# Patient Record
Sex: MALE | Race: WHITE | Employment: FULL TIME | URBAN - METROPOLITAN AREA
[De-identification: names, ages, dates, MRNs, and addresses within clinical notes are randomized per-mention and may not be internally consistent; named-entity substitution may affect disease eponyms.]

---

## 2017-01-05 NOTE — PROGRESS NOTES
[facility] Internal [facility] Progress Note    CC:  Patient presents with:  Medical Question: Deplin & Wellbutrin       HPI:   HPI  Depression/Anxiety  Pt is currently on Wellbutrin and Deplin  Started Deplin in September or October  Pt feels like Gastrointestinal: Negative for nausea and vomiting. Psychiatric/Behavioral: Positive for sleep disturbance and dysphoric mood.  Negative for suicidal ideas, hallucinations, behavioral problems, confusion, self-injury, decreased concentration and agitati unspecified     Dysplastic nevi     Depressive disorder, not elsewhere classified     Cough     Nontoxic uninodular goiter     Goiter, specified as simple     Laboratory examination ordered as part of a routine general medical examination     Special scree

## 2017-01-05 NOTE — PATIENT INSTRUCTIONS
Thank you for choosing Abdiaziz Garcia PA-C at [place] [address]  To Do: Greg Prado  1. Pt to continue current medications as prescribed  2. Follow-up as scheduled  3.  Someone from office will call patient tomorrow to discuss more    • Please s potential risks and we strive to make you healthier and to improve your quality of life.     Referrals, and Radiology Information:    If your insurance requires a referral to a specialist, please allow 5 business days to process your referral request.    If

## 2017-01-06 ENCOUNTER — TELEPHONE (OUTPATIENT)
Dept: FAMILY MEDICINE CLINIC | Facility: CLINIC | Age: 47
End: 2017-01-06

## 2017-01-06 RX ORDER — ESCITALOPRAM OXALATE 10 MG/1
10 TABLET ORAL DAILY
Qty: 30 TABLET | Refills: 0 | Status: SHIPPED | OUTPATIENT
Start: 2017-01-06 | End: 2017-02-21

## 2017-01-06 NOTE — TELEPHONE ENCOUNTER
Please notify pt that I spoke with Dr. Tanner Cat who recommended he stay on Deplin and Wellbutrin, and begin Lexapro 10mg.   We can send more to pharmacy for pt if needed

## 2017-01-06 NOTE — TELEPHONE ENCOUNTER
Spoke with patient and informed him of Dr. Lokesh Ogden recommendations. Lexapro sent into [facility] on file. Patient states that he has enough deplin and wellbutrin to last until his appt.   Future Appointments  Date Time Provider Kiara Harris   1/18/20

## 2017-01-12 ENCOUNTER — TELEPHONE (OUTPATIENT)
Dept: FAMILY MEDICINE CLINIC | Facility: CLINIC | Age: 47
End: 2017-01-12

## 2017-01-12 NOTE — TELEPHONE ENCOUNTER
Requesting Recommendation for therapist  LOV: 1/5/17  RTC: prn  Future Appointments  Date Time Provider Kiara Harris   1/18/2017 9:15 AM Becka Malin MD [facility] [address]   3/6/2017 9:40 AM Becka Malin MD [facility] [address] [facility] [address]       Depression/An

## 2017-01-12 NOTE — TELEPHONE ENCOUNTER
Per Anisha Ayoub patient should see therapist Mitra hobson, Meghan Romero [phone]. Patient informed and he will call and schedule.

## 2017-01-18 ENCOUNTER — APPOINTMENT (OUTPATIENT)
Dept: LAB | Age: 47
End: 2017-01-18
Attending: INTERNAL MEDICINE
Payer: COMMERCIAL

## 2017-01-18 ENCOUNTER — OFFICE VISIT (OUTPATIENT)
Dept: FAMILY MEDICINE CLINIC | Facility: CLINIC | Age: 47
End: 2017-01-18

## 2017-01-18 VITALS
SYSTOLIC BLOOD PRESSURE: 134 MMHG | RESPIRATION RATE: 16 BRPM | HEART RATE: 76 BPM | WEIGHT: 236 LBS | DIASTOLIC BLOOD PRESSURE: 80 MMHG | HEIGHT: 73 IN | BODY MASS INDEX: 31.28 KG/M2

## 2017-01-18 DIAGNOSIS — F32.A ANXIETY AND DEPRESSION: ICD-10-CM

## 2017-01-18 DIAGNOSIS — G47.33 OSA (OBSTRUCTIVE SLEEP APNEA): ICD-10-CM

## 2017-01-18 DIAGNOSIS — R39.198 DECREASED URINE STREAM: Primary | ICD-10-CM

## 2017-01-18 DIAGNOSIS — F41.9 ANXIETY AND DEPRESSION: ICD-10-CM

## 2017-01-18 DIAGNOSIS — R39.198 DECREASED URINE STREAM: ICD-10-CM

## 2017-01-18 LAB
BUN BLD-MCNC: 14 MG/DL (ref 8–20)
CALCIUM BLD-MCNC: 9.3 MG/DL (ref 8.3–10.3)
CHLORIDE: 105 MMOL/L (ref 101–111)
CO2: 29 MMOL/L (ref 22–32)
CREAT BLD-MCNC: 1.17 MG/DL (ref 0.7–1.3)
GLUCOSE BLD-MCNC: 64 MG/DL (ref 70–99)
POTASSIUM SERPL-SCNC: 3.9 MMOL/L (ref 3.6–5.1)
PSA SERPL-MCNC: 0.46 NG/ML (ref 0.01–4)
SODIUM SERPL-SCNC: 140 MMOL/L (ref 136–144)

## 2017-01-18 PROCEDURE — 84153 ASSAY OF PSA TOTAL: CPT

## 2017-01-18 PROCEDURE — 80048 BASIC METABOLIC PNL TOTAL CA: CPT

## 2017-01-18 PROCEDURE — 36415 COLL VENOUS BLD VENIPUNCTURE: CPT

## 2017-01-18 PROCEDURE — 99214 OFFICE O/P EST MOD 30 MIN: CPT | Performed by: INTERNAL MEDICINE

## 2017-01-18 NOTE — PROGRESS NOTES
CC: Patient presents with:  Medication Follow-Up       HPI:   Some decreased urinary stream. Some questions about how meds work. Questions about urinary stream. Mood is better.        Current Outpatient Prescriptions:  escitalopram 10 MG Oral Tab Take apparent distress, A/O x3  HEENT: PERRLA  LUNGS: clear to auscultation bilat   CARDIO: RRR without murmur  GI: +BS's      Orders Placed This Encounter  PSA  Standing Status: Future  Standing Expiration Date: 7/96/9932  Basic Metabolic Panel (8) [E]  Standi

## 2017-01-20 ENCOUNTER — TELEPHONE (OUTPATIENT)
Dept: FAMILY MEDICINE CLINIC | Facility: CLINIC | Age: 47
End: 2017-01-20

## 2017-02-03 ENCOUNTER — TELEPHONE (OUTPATIENT)
Dept: FAMILY MEDICINE CLINIC | Facility: CLINIC | Age: 47
End: 2017-02-03

## 2017-02-03 RX ORDER — BUPROPION HYDROCHLORIDE 150 MG/1
150 TABLET ORAL DAILY
Qty: 30 TABLET | Refills: 2 | Status: SHIPPED | OUTPATIENT
Start: 2017-02-03 | End: 2017-05-09

## 2017-02-03 NOTE — TELEPHONE ENCOUNTER
Requesting Wellbutrin 150mg lower dose  LOV: 1/18/17  RTC: 2 months  Last Labs: n/a      Future Appointments  Date Time Provider Kiara Kimberly   2/17/2017 9:00 AM Anna Pedersen MD [facility]   3/6/2017 9:40 AM Laura Montoya MD [facility]

## 2017-02-22 RX ORDER — ESCITALOPRAM OXALATE 10 MG/1
TABLET ORAL
Qty: 30 TABLET | Refills: 0 | Status: SHIPPED | OUTPATIENT
Start: 2017-02-22 | End: 2017-03-28

## 2017-02-22 NOTE — TELEPHONE ENCOUNTER
Requesting escitalopram  LOV: 1/18/17  RTC: 2 months   Last Labs:   Filled: 1/6/17  #30 with 0 refills    Future Appointments  Date Time Provider Kiara Kimberly   3/6/2017 9:40 AM Saravanan Lawson MD [facility] [address]   3/6/2017 1:00 PM KARLA Garcia

## 2017-03-29 NOTE — TELEPHONE ENCOUNTER
Requesting Escitalopram  LOV: 1/18/17  RTC: 2 months  Last Labs: n/a  Filled: 2/22/17 #30 with 0 refills    Future Appointments  Date Time Provider Kiraa Harris   6/14/2017 10:20 AM Sandra Madison MD [facility] [address]       Non protocol med pended fo

## 2017-03-30 RX ORDER — ESCITALOPRAM OXALATE 10 MG/1
TABLET ORAL
Qty: 30 TABLET | Refills: 2 | Status: SHIPPED | OUTPATIENT
Start: 2017-03-30 | End: 2017-06-21

## 2017-05-10 RX ORDER — BUPROPION HYDROCHLORIDE 150 MG/1
TABLET ORAL
Qty: 30 TABLET | Refills: 0 | Status: SHIPPED | OUTPATIENT
Start: 2017-05-10 | End: 2017-06-12

## 2017-05-10 NOTE — TELEPHONE ENCOUNTER
Requesting: Bupropion XL 150mg  LOV: 1/18/17  RTC: 2 months  Last Labs: 6/10/16  Filled: 2/3/17 #30 with 2 refills    Future Appointments  Date Time Provider Kiara Harris   6/14/2017 10:20 AM Ricardo Noonan MD [facility] [address] [facility] [address]       -Medication pend

## 2017-06-14 RX ORDER — BUPROPION HYDROCHLORIDE 150 MG/1
TABLET ORAL
Qty: 8 TABLET | Refills: 0 | Status: SHIPPED | OUTPATIENT
Start: 2017-06-14 | End: 2017-06-21

## 2017-06-14 NOTE — TELEPHONE ENCOUNTER
Requesting: Bupropion XL 150mg  LOV: 1/18/17  RTC: 2 months  Last Labs: 1/18/17  Filled: 5/10/17 #30 with 0 refills    Future Appointments  Date Time Provider Kiara Harris   6/21/2017 9:20 AM Riley Kebede MD [facility] [address] [facility] [address]       -Medication pend

## 2017-06-14 NOTE — TELEPHONE ENCOUNTER
Shaan is calling to follow up on request as pt is leaving to go out of town today and would like to take medication with him. Please advise.

## 2017-06-14 NOTE — TELEPHONE ENCOUNTER
Requesting : wellbutrin for anxiety and depression  LOV: 1/18/17 discussed the above  RTC: 2 months - March  Last Labs: 6/10/16  Filled: 5/10/17 #30 with 0 refills    Future Appointments  Date Time Provider Kiara Harris   6/21/2017 9:20 AM Maynor Patrick

## 2017-06-21 ENCOUNTER — OFFICE VISIT (OUTPATIENT)
Dept: FAMILY MEDICINE CLINIC | Facility: CLINIC | Age: 47
End: 2017-06-21

## 2017-06-21 VITALS
SYSTOLIC BLOOD PRESSURE: 124 MMHG | RESPIRATION RATE: 16 BRPM | HEART RATE: 78 BPM | DIASTOLIC BLOOD PRESSURE: 80 MMHG | HEIGHT: 72 IN | BODY MASS INDEX: 32.23 KG/M2 | WEIGHT: 238 LBS

## 2017-06-21 DIAGNOSIS — F41.9 ANXIETY AND DEPRESSION: ICD-10-CM

## 2017-06-21 DIAGNOSIS — Z12.5 SCREENING PSA (PROSTATE SPECIFIC ANTIGEN): ICD-10-CM

## 2017-06-21 DIAGNOSIS — F32.A ANXIETY AND DEPRESSION: ICD-10-CM

## 2017-06-21 DIAGNOSIS — G47.33 OSA (OBSTRUCTIVE SLEEP APNEA): Primary | ICD-10-CM

## 2017-06-21 DIAGNOSIS — Z00.00 LABORATORY EXAMINATION ORDERED AS PART OF A ROUTINE GENERAL MEDICAL EXAMINATION: ICD-10-CM

## 2017-06-21 PROCEDURE — 99213 OFFICE O/P EST LOW 20 MIN: CPT | Performed by: INTERNAL MEDICINE

## 2017-06-21 RX ORDER — ESCITALOPRAM OXALATE 10 MG/1
TABLET ORAL
Qty: 90 TABLET | Refills: 3 | Status: SHIPPED | OUTPATIENT
Start: 2017-06-21 | End: 2018-06-26

## 2017-06-21 RX ORDER — BUPROPION HYDROCHLORIDE 150 MG/1
TABLET ORAL
Qty: 30 TABLET | Refills: 0 | OUTPATIENT
Start: 2017-06-21

## 2017-06-21 RX ORDER — BUPROPION HYDROCHLORIDE 150 MG/1
150 TABLET ORAL
Qty: 90 TABLET | Refills: 3 | Status: SHIPPED | OUTPATIENT
Start: 2017-06-21 | End: 2018-06-26

## 2017-06-21 NOTE — PROGRESS NOTES
CC: Patient presents with:  Medication Follow-Up       HPI:   1. DANILO (obstructive sleep apnea)  (primary encounter diagnosis), saw sleep doc, can't afford machine at this point.    2. Anxiety and depression, doing well on wellbutrin and lexparo, mood Date: 6/21/2018  CBC W/DIFF  Standing Status: Future  Standing Expiration Date: 6/21/2018  LIPID PANEL  Standing Status: Future  Standing Expiration Date: 6/21/2018  PSA  Standing Status: Future  Standing Expiration Date: 6/21/2018  TSH  Standing Status: F

## 2017-07-25 NOTE — ADDENDUM NOTE
Encounter addended by: Dari Hall MA on: 7/25/2017  9:40 AM<BR>    Actions taken: Letter status changed

## 2017-08-16 ENCOUNTER — OFFICE VISIT (OUTPATIENT)
Dept: FAMILY MEDICINE CLINIC | Facility: CLINIC | Age: 47
End: 2017-08-16

## 2017-08-16 VITALS
HEIGHT: 72 IN | SYSTOLIC BLOOD PRESSURE: 128 MMHG | BODY MASS INDEX: 31.83 KG/M2 | DIASTOLIC BLOOD PRESSURE: 78 MMHG | HEART RATE: 78 BPM | WEIGHT: 235 LBS | RESPIRATION RATE: 16 BRPM

## 2017-08-16 DIAGNOSIS — L72.3 SCROTAL SEBACEOUS CYST: Primary | ICD-10-CM

## 2017-08-16 PROCEDURE — 99213 OFFICE O/P EST LOW 20 MIN: CPT | Performed by: INTERNAL MEDICINE

## 2017-08-16 NOTE — PROGRESS NOTES
CC: Patient presents with:  Growth: x 2 months       HPI:   Growth on scrotum in past 2 months. No pain or drainage.        Current Outpatient Prescriptions:  escitalopram 10 MG Oral Tab TAKE 1 TABLET(10 MG) BY MOUTH DAILY Disp: 90 tablet Rfl: 3   BuP (primary encounter diagnosis), small about 1 cm, will send to urology for drainage     The patient indicates understanding of these issues and agrees to the plan. Return if symptoms worsen or fail to improve.

## 2018-03-12 ENCOUNTER — TELEPHONE (OUTPATIENT)
Dept: FAMILY MEDICINE CLINIC | Facility: CLINIC | Age: 48
End: 2018-03-12

## 2018-03-12 NOTE — TELEPHONE ENCOUNTER
Pt is requesting a 30 day supply  Of a muscle relaxer and an anti inflamatory for Back pain. Pt doesn't want an appointment if possible. Please advise. Pt is on phone.

## 2018-03-12 NOTE — TELEPHONE ENCOUNTER
Pt would like a refill on medication he was given for his back pain by dr Aldridge Most remember what the medication was called , I suggested for him to make an appt and he refused and got upset and wants a nurse to call him back . Sean Stuart  Please advise

## 2018-03-12 NOTE — TELEPHONE ENCOUNTER
Pt was not able to remember what the medication was called that's when I suggested an appt and he refused and wouldn't talk to me , wanted to speak to a nurse or the doctor, sorry I could not get any more info

## 2018-03-12 NOTE — TELEPHONE ENCOUNTER
PT states he hurt his back over the weekend, informed pt that he will need to be seen in order to make sure he is receiving the appropriate care and that our physicians can not prescribe anything over the phone before examining him.  Pt states all he wants

## 2018-03-12 NOTE — TELEPHONE ENCOUNTER
Attempted to contact patient to discuss. No answer and mailbox is full. What medication is he requesting?

## 2018-03-12 NOTE — TELEPHONE ENCOUNTER
Left detailed message for pt to call back to schedule appt as he has not been seen 08/2017 and needs to EST care with a provider in our office.      LOV: 8/16/17 Dr Jose Angel Saini     Possible medication prescribed for back pain based on pts chart review:  Harrison Lundy

## 2018-05-10 ENCOUNTER — OFFICE VISIT (OUTPATIENT)
Dept: FAMILY MEDICINE CLINIC | Facility: CLINIC | Age: 48
End: 2018-05-10

## 2018-05-10 VITALS
TEMPERATURE: 98 F | SYSTOLIC BLOOD PRESSURE: 130 MMHG | HEIGHT: 72 IN | DIASTOLIC BLOOD PRESSURE: 74 MMHG | RESPIRATION RATE: 16 BRPM | HEART RATE: 70 BPM | BODY MASS INDEX: 32.23 KG/M2 | WEIGHT: 238 LBS

## 2018-05-10 DIAGNOSIS — M54.16 LUMBAR RADICULAR PAIN: Primary | ICD-10-CM

## 2018-05-10 DIAGNOSIS — Z12.11 SCREENING FOR COLON CANCER: ICD-10-CM

## 2018-05-10 PROCEDURE — 99213 OFFICE O/P EST LOW 20 MIN: CPT | Performed by: PHYSICIAN ASSISTANT

## 2018-05-10 RX ORDER — CYCLOBENZAPRINE HCL 10 MG
10 TABLET ORAL 3 TIMES DAILY PRN
Qty: 60 TABLET | Refills: 2 | Status: SHIPPED | OUTPATIENT
Start: 2018-05-10 | End: 2018-05-17

## 2018-05-10 RX ORDER — HYDROCODONE BITARTRATE AND ACETAMINOPHEN 5; 325 MG/1; MG/1
1 TABLET ORAL 2 TIMES DAILY PRN
Qty: 30 TABLET | Refills: 0 | Status: SHIPPED | OUTPATIENT
Start: 2018-05-10 | End: 2018-07-18 | Stop reason: ALTCHOICE

## 2018-05-10 RX ORDER — METHYLPREDNISOLONE 4 MG/1
TABLET ORAL
Qty: 1 KIT | Refills: 0 | Status: SHIPPED | OUTPATIENT
Start: 2018-05-10 | End: 2018-07-18 | Stop reason: ALTCHOICE

## 2018-05-10 NOTE — PATIENT INSTRUCTIONS
Thank you for choosing Twila Anguiano PA-C at [place] [address]  To Do: Manjinder Prado  1. Pt to begin medications as prescribed  2. Heat to back   3. Start physical therapy  4.  Follow-up in 1 month      • Please signup for MY CHART, which is elec company approved your testing, please call Central Scheduling at [phone]  Please allow our office 5 business days to contact you regarding any testing results.     Refill policies:   Allow 3 business days for refills; controlled substances may take cassidy

## 2018-05-17 ENCOUNTER — OFFICE VISIT (OUTPATIENT)
Dept: PHYSICAL THERAPY | Age: 48
End: 2018-05-17
Attending: PHYSICIAN ASSISTANT
Payer: COMMERCIAL

## 2018-05-17 DIAGNOSIS — M54.16 LUMBAR RADICULAR PAIN: ICD-10-CM

## 2018-05-17 PROCEDURE — 97110 THERAPEUTIC EXERCISES: CPT

## 2018-05-17 PROCEDURE — 97163 PT EVAL HIGH COMPLEX 45 MIN: CPT

## 2018-05-17 NOTE — PROGRESS NOTES
SPINE EVALUATION:   Referring Physician: Dr. Lang Sanabria  Diagnosis: Lumbar radicular pain (M54.16)       Date of Service: 5/17/2018     PATIENT SUMMARY   Marty España is a 46year old y/o male who presents to therapy today with complaints of back pa LBP beginning 2 weeks prior.  Exam findings include: impaired lumbar flexion AROM and painful; rigid and guarded posture; LE strength deficits to include B abductors; decreased flexibility of B hip flexors, piriformis, and gastroc-soleus; radiating symptoms pain education, proper body mechanics, activity modification, correct exercise technique, POC, log roll, sleeping with pillow between legs. LA distraction x 2 min. Repeated extensions: no pain though no change/relief with forward flexion.  Attempts at openi Potential:good    FOTO: 16/100    Current G Code: Changing and Maintaining Body Position CM: 80-99% impaired, limited, or restricted  Projected G Code: Changing and Maintaining Body Position CK: 40-59% impaired, limited, or restricted      Patient/Family/C

## 2018-05-22 ENCOUNTER — OFFICE VISIT (OUTPATIENT)
Dept: PHYSICAL THERAPY | Age: 48
End: 2018-05-22
Attending: PHYSICIAN ASSISTANT
Payer: COMMERCIAL

## 2018-05-22 DIAGNOSIS — M54.16 LUMBAR RADICULAR PAIN: ICD-10-CM

## 2018-05-22 PROCEDURE — 97110 THERAPEUTIC EXERCISES: CPT

## 2018-05-22 PROCEDURE — 97140 MANUAL THERAPY 1/> REGIONS: CPT

## 2018-05-22 PROCEDURE — 97014 ELECTRIC STIMULATION THERAPY: CPT

## 2018-05-22 NOTE — PROGRESS NOTES
Dx: Lumbar radicular pain (M54.16)           Authorized # of Visits:  12         Next MD visit: none scheduled  Fall Risk: standard         Precautions: n/a           Referring Provider: Red Mabry    Subjective: Pt states that symptoms are better strength of 5/5 to improve ease of walking for greater than 20 min  · Pt will be independent and compliant with comprehensive HEP to maintain progress achieved in PT          Plan: decrease irritability with manual therapy, progress AROM of lumbar spine, s

## 2018-05-24 ENCOUNTER — OFFICE VISIT (OUTPATIENT)
Dept: PHYSICAL THERAPY | Age: 48
End: 2018-05-24
Attending: PHYSICIAN ASSISTANT
Payer: COMMERCIAL

## 2018-05-24 DIAGNOSIS — M54.16 LUMBAR RADICULAR PAIN: ICD-10-CM

## 2018-05-24 PROCEDURE — 97014 ELECTRIC STIMULATION THERAPY: CPT

## 2018-05-24 PROCEDURE — 97140 MANUAL THERAPY 1/> REGIONS: CPT

## 2018-05-24 PROCEDURE — 97110 THERAPEUTIC EXERCISES: CPT

## 2018-05-24 NOTE — PROGRESS NOTES
Dx: Lumbar radicular pain (M54.16)           Authorized # of Visits:  12         Next MD visit: none scheduled  Fall Risk: standard         Precautions: n/a           Referring Provider: Imani Swartz    Subjective: Pt felt a little sore following se improve ease of walking for greater than 20 min  · Pt will be independent and compliant with comprehensive HEP to maintain progress achieved in PT        Plan: decrease irritability with manual therapy, progress AROM of lumbar spine, strengthening LE, prog

## 2018-05-30 ENCOUNTER — OFFICE VISIT (OUTPATIENT)
Dept: PHYSICAL THERAPY | Age: 48
End: 2018-05-30
Attending: PHYSICIAN ASSISTANT
Payer: COMMERCIAL

## 2018-05-30 DIAGNOSIS — M54.16 LUMBAR RADICULAR PAIN: ICD-10-CM

## 2018-05-30 PROCEDURE — 97140 MANUAL THERAPY 1/> REGIONS: CPT

## 2018-05-30 PROCEDURE — 97110 THERAPEUTIC EXERCISES: CPT

## 2018-05-30 PROCEDURE — 97014 ELECTRIC STIMULATION THERAPY: CPT

## 2018-05-30 NOTE — PROGRESS NOTES
Dx: Lumbar radicular pain (M54.16)           Authorized # of Visits:  12         Next MD visit: none scheduled  Fall Risk: standard         Precautions: n/a           Referring Provider: Rio Hutchison    Subjective: Pt did a lot of running around [employer]     Date: 5/22/2018  Tx#: 2/12 Date: 5/24/2018  Tx#: 3/12 Date: 5/30/2018  Tx#: 4/12 Date: Tx#: 5/ Date: Tx#: 6/ Date: Tx#: 7/ Date:    Tx#: 8/   Nu-Step 5 min, 2.5 level 5 min,  5 min, 3 level       Manual P-A, transverse lumbar spine, mid

## 2018-06-04 ENCOUNTER — APPOINTMENT (OUTPATIENT)
Dept: PHYSICAL THERAPY | Age: 48
End: 2018-06-04
Attending: PHYSICIAN ASSISTANT
Payer: COMMERCIAL

## 2018-06-06 ENCOUNTER — OFFICE VISIT (OUTPATIENT)
Dept: PHYSICAL THERAPY | Age: 48
End: 2018-06-06
Attending: PHYSICIAN ASSISTANT
Payer: COMMERCIAL

## 2018-06-06 ENCOUNTER — APPOINTMENT (OUTPATIENT)
Dept: PHYSICAL THERAPY | Age: 48
End: 2018-06-06
Attending: PHYSICIAN ASSISTANT
Payer: COMMERCIAL

## 2018-06-06 PROCEDURE — 97110 THERAPEUTIC EXERCISES: CPT | Performed by: PHYSICAL THERAPIST

## 2018-06-06 PROCEDURE — 97014 ELECTRIC STIMULATION THERAPY: CPT | Performed by: PHYSICAL THERAPIST

## 2018-06-06 PROCEDURE — 97140 MANUAL THERAPY 1/> REGIONS: CPT | Performed by: PHYSICAL THERAPIST

## 2018-06-06 NOTE — PROGRESS NOTES
Dx: Lumbar radicular pain (M54.16)           Authorized # of Visits:  12         Next MD visit: none scheduled  Fall Risk: standard         Precautions: n/a           Referring Provider: Pili Daugherty    Subjective: Pt reports that increased physical 6/7/2018  Tx#: 5/ Date: Tx#: 6/ Date: Tx#: 7/ Date:    Tx#: 8/   Nu-Step 5 min, 2.5 level 5 min,  5 min, 3 level X 5'       Manual P-A, transverse lumbar spine, midlevel Grade III; STM thoracic and lumbar parapsinals B; lacrosse ball to gluteals R; hip

## 2018-06-18 ENCOUNTER — OFFICE VISIT (OUTPATIENT)
Dept: PHYSICAL THERAPY | Age: 48
End: 2018-06-18
Attending: PHYSICIAN ASSISTANT
Payer: COMMERCIAL

## 2018-06-18 DIAGNOSIS — M54.16 LUMBAR RADICULAR PAIN: ICD-10-CM

## 2018-06-18 PROCEDURE — 97110 THERAPEUTIC EXERCISES: CPT | Performed by: PHYSICAL THERAPIST

## 2018-06-18 PROCEDURE — 97140 MANUAL THERAPY 1/> REGIONS: CPT | Performed by: PHYSICAL THERAPIST

## 2018-06-18 PROCEDURE — 97112 NEUROMUSCULAR REEDUCATION: CPT | Performed by: PHYSICAL THERAPIST

## 2018-06-18 NOTE — PROGRESS NOTES
Dx: Lumbar radicular pain (M54.16)           Authorized # of Visits:  12         Next MD visit: none scheduled  Fall Risk: standard         Precautions: n/a           Referring Provider: Ricki Franz    Subjective: Pt reports that he has been busy a 5'  TM x 3.5 min with      Manual P-A, transverse lumbar spine, midlevel Grade III; STM thoracic and lumbar parapsinals B; lacrosse ball to gluteals R; hip grade 3 inferior and lateral mobs MWM R x 25 min P-A, transverse lumbar, grade III; grade 4 oscillat

## 2018-06-20 ENCOUNTER — APPOINTMENT (OUTPATIENT)
Dept: PHYSICAL THERAPY | Age: 48
End: 2018-06-20
Attending: PHYSICIAN ASSISTANT
Payer: COMMERCIAL

## 2018-06-25 ENCOUNTER — TELEPHONE (OUTPATIENT)
Dept: PHYSICAL THERAPY | Age: 48
End: 2018-06-25

## 2018-06-25 ENCOUNTER — APPOINTMENT (OUTPATIENT)
Dept: PHYSICAL THERAPY | Age: 48
End: 2018-06-25
Attending: PHYSICIAN ASSISTANT
Payer: COMMERCIAL

## 2018-06-25 NOTE — TELEPHONE ENCOUNTER
Patient will need refills on the generic for Welbutrin and Lexapro. Patient only has two days left of medication. Yeni send to [facility]. 30. Patient was last seen in May 2018.

## 2018-06-26 RX ORDER — BUPROPION HYDROCHLORIDE 150 MG/1
TABLET ORAL
Qty: 90 TABLET | Refills: 0 | Status: SHIPPED | OUTPATIENT
Start: 2018-06-26 | End: 2018-09-26

## 2018-06-26 RX ORDER — BUPROPION HYDROCHLORIDE 150 MG/1
150 TABLET ORAL
Qty: 90 TABLET | Refills: 3 | OUTPATIENT
Start: 2018-06-26

## 2018-06-26 RX ORDER — ESCITALOPRAM OXALATE 10 MG/1
TABLET ORAL
Qty: 90 TABLET | Refills: 3 | OUTPATIENT
Start: 2018-06-26

## 2018-06-26 RX ORDER — ESCITALOPRAM OXALATE 10 MG/1
TABLET ORAL
Qty: 90 TABLET | Refills: 0 | Status: SHIPPED | OUTPATIENT
Start: 2018-06-26 | End: 2018-09-26

## 2018-06-26 NOTE — TELEPHONE ENCOUNTER
ESCITALOPRAM 10MG TABLETS  Will file in chart as: ESCITALOPRAM 10 MG Oral Tab  TAKE 1 TABLET(10 MG) BY MOUTH DAILY       Disp: 90 tablet Refills: 0    Class: Normal Start: 6/26/2018   Originally ordered: 1 year ago by Breonna Cordova MD  Last refill: 3/10/201

## 2018-06-27 ENCOUNTER — APPOINTMENT (OUTPATIENT)
Dept: PHYSICAL THERAPY | Age: 48
End: 2018-06-27
Attending: PHYSICIAN ASSISTANT
Payer: COMMERCIAL

## 2018-06-27 ENCOUNTER — TELEPHONE (OUTPATIENT)
Dept: PHYSICAL THERAPY | Age: 48
End: 2018-06-27

## 2018-09-26 NOTE — TELEPHONE ENCOUNTER
Patient called and states that [facility] requested refills for ESCITALOPRAM 10 MG Oral Tab and for  Bupropion HCL  mg  He states that Betzy Coats told him he shouldn't have to request refills every 90 days, after I advised him that his last fill had no r

## 2018-09-27 RX ORDER — BUPROPION HYDROCHLORIDE 150 MG/1
TABLET ORAL
Qty: 90 TABLET | Refills: 1 | Status: SHIPPED | OUTPATIENT
Start: 2018-09-27 | End: 2019-03-28

## 2018-09-27 RX ORDER — ESCITALOPRAM OXALATE 10 MG/1
TABLET ORAL
Qty: 90 TABLET | Refills: 1 | Status: SHIPPED | OUTPATIENT
Start: 2018-09-27 | End: 2019-02-06

## 2018-09-27 NOTE — TELEPHONE ENCOUNTER
Requesting Bupropion ER, Escitalopram 10mg  LOV: 5/10/18  RTC: 4 weeks  Last Relevant Labs: n/a  Filled: 6/26/18 #90 with 0 refills both medications    Future Appointments   Date Time Provider Kiara Harris   1/11/2019  9:30 AM Carol Pearce MD [facility]

## 2018-09-27 NOTE — TELEPHONE ENCOUNTER
Pt is now completely out of BUPROPION HCL ER, XL, 150 MG Oral Tablet 24 Hr. He states he has been waiting since last Friday to get both BUPROPION HCL ER, XL, 150 MG Oral Tablet 24 Hr and ESCITALOPRAM 10 MG Oral Tab to be filled.

## 2019-02-06 ENCOUNTER — OFFICE VISIT (OUTPATIENT)
Dept: FAMILY MEDICINE CLINIC | Facility: CLINIC | Age: 49
End: 2019-02-06
Payer: COMMERCIAL

## 2019-02-06 ENCOUNTER — TELEPHONE (OUTPATIENT)
Dept: FAMILY MEDICINE CLINIC | Facility: CLINIC | Age: 49
End: 2019-02-06

## 2019-02-06 ENCOUNTER — LAB ENCOUNTER (OUTPATIENT)
Dept: LAB | Age: 49
End: 2019-02-06
Attending: FAMILY MEDICINE
Payer: COMMERCIAL

## 2019-02-06 VITALS
HEART RATE: 70 BPM | RESPIRATION RATE: 16 BRPM | DIASTOLIC BLOOD PRESSURE: 90 MMHG | BODY MASS INDEX: 32.74 KG/M2 | TEMPERATURE: 98 F | SYSTOLIC BLOOD PRESSURE: 146 MMHG | WEIGHT: 247 LBS | HEIGHT: 73 IN

## 2019-02-06 DIAGNOSIS — Z13.228 SCREENING FOR ENDOCRINE, METABOLIC AND IMMUNITY DISORDER: ICD-10-CM

## 2019-02-06 DIAGNOSIS — F32.A ANXIETY AND DEPRESSION: ICD-10-CM

## 2019-02-06 DIAGNOSIS — Z13.29 SCREENING FOR ENDOCRINE, METABOLIC AND IMMUNITY DISORDER: ICD-10-CM

## 2019-02-06 DIAGNOSIS — F41.9 ANXIETY AND DEPRESSION: ICD-10-CM

## 2019-02-06 DIAGNOSIS — Z13.0 SCREENING FOR ENDOCRINE, METABOLIC AND IMMUNITY DISORDER: ICD-10-CM

## 2019-02-06 DIAGNOSIS — R07.9 CHEST PAIN, UNSPECIFIED TYPE: Primary | ICD-10-CM

## 2019-02-06 LAB
ALBUMIN SERPL-MCNC: 3.7 G/DL (ref 3.1–4.5)
ALBUMIN/GLOB SERPL: 1 {RATIO} (ref 1–2)
ALP LIVER SERPL-CCNC: 51 U/L (ref 45–117)
ALT SERPL-CCNC: 55 U/L (ref 17–63)
ANION GAP SERPL CALC-SCNC: 5 MMOL/L (ref 0–18)
AST SERPL-CCNC: 26 U/L (ref 15–41)
BASOPHILS # BLD AUTO: 0.04 X10(3) UL (ref 0–0.2)
BASOPHILS NFR BLD AUTO: 0.8 %
BILIRUB SERPL-MCNC: 0.4 MG/DL (ref 0.1–2)
BUN BLD-MCNC: 16 MG/DL (ref 8–20)
BUN/CREAT SERPL: 15.7 (ref 10–20)
CALCIUM BLD-MCNC: 8.8 MG/DL (ref 8.3–10.3)
CHLORIDE SERPL-SCNC: 105 MMOL/L (ref 101–111)
CHOLEST SMN-MCNC: 189 MG/DL (ref ?–200)
CO2 SERPL-SCNC: 31 MMOL/L (ref 22–32)
COMPLEXED PSA SERPL-MCNC: 0.61 NG/ML (ref 0.01–4)
CREAT BLD-MCNC: 1.02 MG/DL (ref 0.7–1.3)
DEPRECATED RDW RBC AUTO: 38.1 FL (ref 35.1–46.3)
EOSINOPHIL # BLD AUTO: 0.08 X10(3) UL (ref 0–0.7)
EOSINOPHIL NFR BLD AUTO: 1.6 %
ERYTHROCYTE [DISTWIDTH] IN BLOOD BY AUTOMATED COUNT: 12.5 % (ref 11–15)
GLOBULIN PLAS-MCNC: 3.6 G/DL (ref 2.8–4.4)
GLUCOSE BLD-MCNC: 83 MG/DL (ref 70–99)
HCT VFR BLD AUTO: 45.9 % (ref 39–53)
HDLC SERPL-MCNC: 67 MG/DL (ref 40–59)
HGB BLD-MCNC: 14.9 G/DL (ref 13–17.5)
IMM GRANULOCYTES # BLD AUTO: 0.01 X10(3) UL (ref 0–1)
IMM GRANULOCYTES NFR BLD: 0.2 %
LDLC SERPL CALC-MCNC: 81 MG/DL (ref ?–100)
LYMPHOCYTES # BLD AUTO: 1.75 X10(3) UL (ref 1–4)
LYMPHOCYTES NFR BLD AUTO: 34.3 %
M PROTEIN MFR SERPL ELPH: 7.3 G/DL (ref 6.4–8.2)
MCH RBC QN AUTO: 27.3 PG (ref 26–34)
MCHC RBC AUTO-ENTMCNC: 32.5 G/DL (ref 31–37)
MCV RBC AUTO: 84.1 FL (ref 80–100)
MONOCYTES # BLD AUTO: 0.5 X10(3) UL (ref 0.1–1)
MONOCYTES NFR BLD AUTO: 9.8 %
NEUTROPHILS # BLD AUTO: 2.72 X10 (3) UL (ref 1.5–7.7)
NEUTROPHILS # BLD AUTO: 2.72 X10(3) UL (ref 1.5–7.7)
NEUTROPHILS NFR BLD AUTO: 53.3 %
NONHDLC SERPL-MCNC: 122 MG/DL (ref ?–130)
OSMOLALITY SERPL CALC.SUM OF ELEC: 292 MOSM/KG (ref 275–295)
PLATELET # BLD AUTO: 187 10(3)UL (ref 150–450)
POTASSIUM SERPL-SCNC: 4.2 MMOL/L (ref 3.6–5.1)
RBC # BLD AUTO: 5.46 X10(6)UL (ref 4.3–5.7)
SODIUM SERPL-SCNC: 141 MMOL/L (ref 136–144)
TRIGL SERPL-MCNC: 205 MG/DL (ref 30–149)
TSI SER-ACNC: 0.58 MIU/ML (ref 0.35–5.5)
VLDLC SERPL CALC-MCNC: 41 MG/DL (ref 0–30)
WBC # BLD AUTO: 5.1 X10(3) UL (ref 4–11)

## 2019-02-06 PROCEDURE — 93000 ELECTROCARDIOGRAM COMPLETE: CPT | Performed by: FAMILY MEDICINE

## 2019-02-06 PROCEDURE — 80061 LIPID PANEL: CPT | Performed by: FAMILY MEDICINE

## 2019-02-06 PROCEDURE — 80050 GENERAL HEALTH PANEL: CPT | Performed by: FAMILY MEDICINE

## 2019-02-06 PROCEDURE — 99203 OFFICE O/P NEW LOW 30 MIN: CPT | Performed by: FAMILY MEDICINE

## 2019-02-06 PROCEDURE — 36415 COLL VENOUS BLD VENIPUNCTURE: CPT | Performed by: FAMILY MEDICINE

## 2019-02-06 PROCEDURE — 84153 ASSAY OF PSA TOTAL: CPT | Performed by: FAMILY MEDICINE

## 2019-02-06 RX ORDER — ESCITALOPRAM OXALATE 20 MG/1
20 TABLET ORAL DAILY
Qty: 90 TABLET | Refills: 1 | Status: SHIPPED | OUTPATIENT
Start: 2019-02-06 | End: 2019-08-20

## 2019-02-06 NOTE — TELEPHONE ENCOUNTER
Pt is calling in regards to some chest pain/discomfort & nausea. Pt states it been going on for the past month or so. He doesn't know if it is anxiety, heartburn or indigestion. He says is concerned now bc he is experiencing tightness.     Please call p

## 2019-02-06 NOTE — PROGRESS NOTES
HPI:    Patient ID: Zach Archer is a 44year old male.     HPI  Mr. Tonia Frazier is a pleasant 52-year-old gentleman with known history of depression and anxiety and obesity and DANILO  here today for chest pain for the past 2 months which she localizes on t ear canal normal.   Left Ear: External ear and ear canal normal.   Mouth/Throat: Oropharynx is clear and moist. No oropharyngeal exudate. Eyes: Conjunctivae are normal. No scleral icterus. Neck: Neck supple. No thyromegaly present.    Cardiovascular: No mouth daily.        Imaging & Referrals:  ELECTROCARDIOGRAM, COMPLETE  CARD TREADMILL STRESS, ADULT (CPT=93017)  CT CALCIUM SCORING       [facility]#[id number]

## 2019-02-06 NOTE — PATIENT INSTRUCTIONS
Thank you for choosing No Soliz MD at [employer] [facility]  To Do: Antwan Prado  1. Please take Aspirin 81 mg daily  [facility] is located in [address]. Monday, Tuesday & Friday – 8 a.m. to 4 p.m.   Wednesday, Thursday – 7 a.m. to 3 p • Please call our office about any questions regarding your treatment/medicines/tests as a result of today's visit.  For your safety, read the entire package insert of all medicines prescribed to you and be aware of all of the risks of treatment even beyon Almost everyone gets nervous now and then. It’s normal to have knots in your stomach before a test, or for your heart to race on a first date. But an anxiety disorder is much more than a case of nerves. In fact, its symptoms may be overwhelming.  But treatm You may believe that nothing can help you. Or, you might fear what others may think. But most anxiety symptoms can be eased. Having an anxiety disorder is nothing to be ashamed of. Most people do best with treatment that combines medicine and therapy.  Thes Anxiety can become a problem when it is hard to control, occurs for months, and interferes with important parts of your life. With an anxiety disorder, your body has the response described above, but in inappropriate ways.  The response a person has depends · Educate yourself about anxiety disorders. Keep track of helpful online resources and books you can use during stressful periods. · Try stress management techniques such as meditation. · Consider online or in-person support groups.    Date Last Reviewed:

## 2019-02-06 NOTE — TELEPHONE ENCOUNTER
Chest pain on and off for 1 month, has history of anxiety. Around Holiday walked away from his job, he thinks his pain is due to stress. He was a patient of Dr Dave Brizuela.  I explained that with chest pain we always want to rule out heart attack, I encouraged Dad wanted to let me know what happened yesterday with Dr. Nicole and Dr. Ayon, I let him know that I had seen the reports.  She is to follow up with Dr. Ayon next week.  I gave him the result of her labs, ESR still elevated but less than initial one

## 2019-02-12 ENCOUNTER — TELEPHONE (OUTPATIENT)
Dept: FAMILY MEDICINE CLINIC | Facility: CLINIC | Age: 49
End: 2019-02-12

## 2019-02-12 NOTE — TELEPHONE ENCOUNTER
Erlinda from [facility] Radiology called and requested a copy of the CT scan - patient was there today.  Faxed the order to [phone] - confirmation rec'd

## 2019-02-13 ENCOUNTER — HOSPITAL ENCOUNTER (OUTPATIENT)
Dept: CV DIAGNOSTICS | Age: 49
Discharge: HOME | End: 2019-02-13
Attending: FAMILY MEDICINE
Payer: COMMERCIAL

## 2019-02-13 DIAGNOSIS — R07.9 CHEST PAIN, UNSPECIFIED TYPE: ICD-10-CM

## 2019-02-13 PROCEDURE — 93018 CV STRESS TEST I&R ONLY: CPT | Performed by: FAMILY MEDICINE

## 2019-02-13 PROCEDURE — 93017 CV STRESS TEST TRACING ONLY: CPT | Performed by: FAMILY MEDICINE

## 2019-02-20 ENCOUNTER — OFFICE VISIT (OUTPATIENT)
Dept: FAMILY MEDICINE CLINIC | Facility: CLINIC | Age: 49
End: 2019-02-20
Payer: COMMERCIAL

## 2019-02-20 VITALS
BODY MASS INDEX: 33.13 KG/M2 | RESPIRATION RATE: 16 BRPM | TEMPERATURE: 98 F | WEIGHT: 250 LBS | DIASTOLIC BLOOD PRESSURE: 84 MMHG | HEIGHT: 73 IN | SYSTOLIC BLOOD PRESSURE: 128 MMHG | HEART RATE: 80 BPM

## 2019-02-20 DIAGNOSIS — F32.A DEPRESSION, UNSPECIFIED DEPRESSION TYPE: Primary | ICD-10-CM

## 2019-02-20 DIAGNOSIS — F41.9 ANXIETY: ICD-10-CM

## 2019-02-20 DIAGNOSIS — R07.9 CHEST PAIN, UNSPECIFIED TYPE: ICD-10-CM

## 2019-02-20 PROCEDURE — 99214 OFFICE O/P EST MOD 30 MIN: CPT | Performed by: FAMILY MEDICINE

## 2019-02-20 NOTE — PATIENT INSTRUCTIONS
Thank you for choosing Prachi Dempsey MD at [place] [address]  To Do: Belen Prado  1. Please take meds as directed. [facility] is located in [address]. Monday, Tuesday & Friday – 8 a.m. to 4 p.m.   Wednesday, Thursday – 7 a.m. to 3 p outweigh those potential risks and we strive to make you healthier and to improve your quality of life.     Referrals, and Radiology Information:    If your insurance requires a referral to a specialist, please allow 5 business days to process your referral

## 2019-02-20 NOTE — PROGRESS NOTES
HPI:    Patient ID: Oskar Silveira is a 44year old male. HPI  Mr. Dereck Garcia is a pleasant 78-year-old gentleman with known history of depression and anxiety and obesity and DANILO  here today for chest pain.   He had a treadmill stress test done which I distress. HENT:   Mouth/Throat: Oropharynx is clear and moist. No oropharyngeal exudate. Eyes: Conjunctivae are normal. No scleral icterus. Neck: Neck supple. No thyromegaly present.    Cardiovascular: Normal rate, regular rhythm and normal heart soun

## 2019-02-21 ENCOUNTER — TELEPHONE (OUTPATIENT)
Dept: FAMILY MEDICINE CLINIC | Facility: CLINIC | Age: 49
End: 2019-02-21

## 2019-02-21 ENCOUNTER — PATIENT MESSAGE (OUTPATIENT)
Dept: FAMILY MEDICINE CLINIC | Facility: CLINIC | Age: 49
End: 2019-02-21

## 2019-02-21 NOTE — TELEPHONE ENCOUNTER
Kindly inform [facility] that I had reviewed CT calcium scoring which shows a total calcium score for which we will put him at low risk for cardiovascular disease. This is with minimal plaque burden. Coronary artery disease unlikely.   Encouraged still to [facility]

## 2019-02-21 NOTE — PROGRESS NOTES
CT calcium scoring test ordered, not yet scheduled  Asked pt where he had test completed?     Awaiting response

## 2019-02-21 NOTE — TELEPHONE ENCOUNTER
Diabetes/Glucose Control    • Glucose maintained within prescribed range Progressing        GENITOURINARY - ADULT    • Absence of urinary retention Progressing        PAIN - ADULT    • Verbalizes/displays adequate comfort level or patient's stated pain goa From: Reilly Bo  To: Mary Alice Loco MD  Sent: 2/21/2019 9:45 AM CST  Subject: Test Results Question    Any news about the Calcium score results yet?

## 2019-03-28 RX ORDER — BUPROPION HYDROCHLORIDE 150 MG/1
TABLET ORAL
Qty: 90 TABLET | Refills: 1 | Status: SHIPPED | OUTPATIENT
Start: 2019-03-28 | End: 2019-08-20

## 2019-03-28 NOTE — TELEPHONE ENCOUNTER
Requesting BUPROPION HCL ER, XL, 150 MG  LOV: 2/20/19  RTC: 6 months  Last Relevant Labs: 2/6/19  Filled: 9/27/18 #90 with 1 refills    Future Appointments   Date Time Provider Kiara Harris   8/20/2019  9:00 AM Jeni Guthrie MD [facility] [address]

## 2019-08-20 NOTE — PATIENT INSTRUCTIONS
Thank you for choosing Jeevan Davidson MD at [address]  To Do: Loretta Prado  1. Please take meds as directed. [facility] is located in [address]. Monday, Tuesday & Friday – 8 a.m. to 4 p.m.   Wednesday, Thursday – 7 a.m. to 3 p outweigh those potential risks and we strive to make you healthier and to improve your quality of life.     Referrals, and Radiology Information:    If your insurance requires a referral to a specialist, please allow 5 business days to process your referral withdrawn  · Loss of interest in things you once enjoyed  · Trouble concentrating, poor memory, trouble making decisions  · Thoughts of harming or killing oneself, or thoughts that life is not worth living  · Low self-esteem  The treatment for depression m confused  · Feel very drowsy or have trouble awakening  · Faint or lose consciousness  · Have new chest pain that becomes more severe, lasts longer, or spreads into your shoulder, arm, neck, jaw, or back  When to seek medical advice  Call your healthcare p

## 2019-08-20 NOTE — PROGRESS NOTES
HPI:    Patient ID: Keyla Cabello is a 44year old male. HPI  Mr. Paula Lovell is a pleasant 45 y/o M with history of depression and anxiety. I had recently increased his Lexapro to 20 mg daily. He continues to take Wellbutrin.   He had also lost weigh He has no wheezes. He has no rales. Abdominal: Soft. Bowel sounds are normal. He exhibits no distension. There is no tenderness. Musculoskeletal: He exhibits no edema. Lymphadenopathy:     He has no cervical adenopathy. Neurological: He is alert.

## 2020-03-05 RX ORDER — ESCITALOPRAM OXALATE 20 MG/1
TABLET ORAL
Qty: 90 TABLET | Refills: 1 | Status: SHIPPED | OUTPATIENT
Start: 2020-03-05 | End: 2020-08-10

## 2020-03-05 RX ORDER — BUPROPION HYDROCHLORIDE 150 MG/1
TABLET ORAL
Qty: 90 TABLET | Refills: 1 | Status: SHIPPED | OUTPATIENT
Start: 2020-03-05 | End: 2020-08-10

## 2020-03-05 NOTE — TELEPHONE ENCOUNTER
Requested Prescriptions     Pending Prescriptions Disp Refills   • ESCITALOPRAM 20 MG Oral Tab [Pharmacy Med Name: ESCITALOPRAM 20MG TABLETS] 90 tablet 1     Sig: TAKE 1 TABLET BY MOUTH DAILY   • BUPROPION HCL ER, XL, 150 MG Oral Tablet 24 Hr [Pharmacy Med

## 2020-08-10 ENCOUNTER — OFFICE VISIT (OUTPATIENT)
Dept: FAMILY MEDICINE CLINIC | Facility: CLINIC | Age: 50
End: 2020-08-10
Payer: COMMERCIAL

## 2020-08-10 VITALS
WEIGHT: 221 LBS | RESPIRATION RATE: 16 BRPM | SYSTOLIC BLOOD PRESSURE: 120 MMHG | HEART RATE: 72 BPM | HEIGHT: 73 IN | BODY MASS INDEX: 29.29 KG/M2 | DIASTOLIC BLOOD PRESSURE: 80 MMHG | OXYGEN SATURATION: 97 % | TEMPERATURE: 97 F

## 2020-08-10 DIAGNOSIS — Z00.00 LABORATORY EXAM ORDERED AS PART OF ROUTINE GENERAL MEDICAL EXAMINATION: Primary | ICD-10-CM

## 2020-08-10 DIAGNOSIS — Z12.11 COLON CANCER SCREENING: ICD-10-CM

## 2020-08-10 DIAGNOSIS — F32.A ANXIETY AND DEPRESSION: ICD-10-CM

## 2020-08-10 DIAGNOSIS — F41.9 ANXIETY AND DEPRESSION: ICD-10-CM

## 2020-08-10 PROBLEM — R39.198 DECREASED URINE STREAM: Status: RESOLVED | Noted: 2017-01-18 | Resolved: 2020-08-10

## 2020-08-10 PROCEDURE — 3008F BODY MASS INDEX DOCD: CPT | Performed by: FAMILY MEDICINE

## 2020-08-10 PROCEDURE — 3079F DIAST BP 80-89 MM HG: CPT | Performed by: FAMILY MEDICINE

## 2020-08-10 PROCEDURE — 99213 OFFICE O/P EST LOW 20 MIN: CPT | Performed by: FAMILY MEDICINE

## 2020-08-10 PROCEDURE — 3074F SYST BP LT 130 MM HG: CPT | Performed by: FAMILY MEDICINE

## 2020-08-10 RX ORDER — BUPROPION HYDROCHLORIDE 150 MG/1
150 TABLET ORAL DAILY
Qty: 90 TABLET | Refills: 3 | Status: SHIPPED | OUTPATIENT
Start: 2020-08-10 | End: 2021-01-21

## 2020-08-10 RX ORDER — ESCITALOPRAM OXALATE 20 MG/1
20 TABLET ORAL DAILY
Qty: 90 TABLET | Refills: 3 | Status: SHIPPED | OUTPATIENT
Start: 2020-08-10 | End: 2021-01-21

## 2020-08-10 NOTE — PROGRESS NOTES
HPI:    Patient ID: John Paul Hsu is a 44year old male. HPI  Mr. Caden Juan is a pleasant 53-year-old gentleman with history of depression anxiety here today for his follow-up appointment. He reports that Lexapro and Wellbutrin has been helpful.   So cervical adenopathy. Neurological: He is alert. Psychiatric: He has a normal mood and affect. His behavior is normal.   Vitals reviewed.              ASSESSMENT/PLAN:   Laboratory exam ordered as part of routine general medical examination  (primary enc

## 2020-08-24 ENCOUNTER — APPOINTMENT (OUTPATIENT)
Dept: LAB | Age: 50
End: 2020-08-24
Attending: INTERNAL MEDICINE
Payer: COMMERCIAL

## 2020-08-24 DIAGNOSIS — Z01.818 PRE-OP TESTING: ICD-10-CM

## 2020-08-25 LAB — SARS-COV-2 RNA RESP QL NAA+PROBE: NOT DETECTED

## 2020-08-26 PROBLEM — D12.5 BENIGN NEOPLASM OF SIGMOID COLON: Status: ACTIVE | Noted: 2020-08-26

## 2020-08-26 PROBLEM — Z12.11 SPECIAL SCREENING FOR MALIGNANT NEOPLASM OF COLON: Status: ACTIVE | Noted: 2020-08-26

## 2020-09-09 ENCOUNTER — PATIENT MESSAGE (OUTPATIENT)
Dept: FAMILY MEDICINE CLINIC | Facility: CLINIC | Age: 50
End: 2020-09-09

## 2020-09-10 NOTE — TELEPHONE ENCOUNTER
From: Hardeep Rodriguez  To: Alma Schmitz MD  Sent: 9/9/2020 1:28 PM CDT  Subject: Visit Follow-up Question    I had an appointment scheduled for 9/1 that the facility cancelled due to Covid. I haven't had a chance to go online to reschedule.  If you hav

## 2020-12-24 LAB — AMB EXT COVID-19 RESULT: DETECTED

## 2021-01-21 ENCOUNTER — HOSPITAL ENCOUNTER (OUTPATIENT)
Dept: GENERAL RADIOLOGY | Age: 51
Discharge: HOME | End: 2021-01-21
Attending: FAMILY MEDICINE
Payer: COMMERCIAL

## 2021-01-21 ENCOUNTER — OFFICE VISIT (OUTPATIENT)
Dept: FAMILY MEDICINE CLINIC | Facility: CLINIC | Age: 51
End: 2021-01-21
Payer: COMMERCIAL

## 2021-01-21 ENCOUNTER — TELEPHONE (OUTPATIENT)
Dept: FAMILY MEDICINE CLINIC | Facility: CLINIC | Age: 51
End: 2021-01-21

## 2021-01-21 ENCOUNTER — LAB ENCOUNTER (OUTPATIENT)
Dept: LAB | Age: 51
End: 2021-01-21
Attending: FAMILY MEDICINE
Payer: COMMERCIAL

## 2021-01-21 VITALS
BODY MASS INDEX: 29.55 KG/M2 | HEART RATE: 68 BPM | SYSTOLIC BLOOD PRESSURE: 120 MMHG | DIASTOLIC BLOOD PRESSURE: 80 MMHG | WEIGHT: 223 LBS | RESPIRATION RATE: 16 BRPM | HEIGHT: 73 IN | TEMPERATURE: 97 F | OXYGEN SATURATION: 97 %

## 2021-01-21 DIAGNOSIS — Z13.220 SCREENING, LIPID: ICD-10-CM

## 2021-01-21 DIAGNOSIS — R68.83 CHILLS: ICD-10-CM

## 2021-01-21 DIAGNOSIS — Z12.5 SCREENING FOR MALIGNANT NEOPLASM OF PROSTATE: ICD-10-CM

## 2021-01-21 DIAGNOSIS — R53.83 FATIGUE, UNSPECIFIED TYPE: ICD-10-CM

## 2021-01-21 DIAGNOSIS — R53.83 FATIGUE, UNSPECIFIED TYPE: Primary | ICD-10-CM

## 2021-01-21 DIAGNOSIS — U07.1 COVID-19: ICD-10-CM

## 2021-01-21 LAB
ALBUMIN SERPL-MCNC: 4 G/DL (ref 3.4–5)
ALBUMIN/GLOB SERPL: 1.3 {RATIO} (ref 1–2)
ALP LIVER SERPL-CCNC: 54 U/L
ALT SERPL-CCNC: 32 U/L
ANION GAP SERPL CALC-SCNC: 2 MMOL/L (ref 0–18)
AST SERPL-CCNC: 22 U/L (ref 15–37)
BASOPHILS # BLD AUTO: 0.03 X10(3) UL (ref 0–0.2)
BASOPHILS NFR BLD AUTO: 0.3 %
BILIRUB SERPL-MCNC: 0.4 MG/DL (ref 0.1–2)
BUN BLD-MCNC: 21 MG/DL (ref 7–18)
BUN/CREAT SERPL: 19.8 (ref 10–20)
CALCIUM BLD-MCNC: 9.6 MG/DL (ref 8.5–10.1)
CHLORIDE SERPL-SCNC: 104 MMOL/L (ref 98–112)
CHOLEST SMN-MCNC: 211 MG/DL (ref ?–200)
CO2 SERPL-SCNC: 29 MMOL/L (ref 21–32)
COMPLEXED PSA SERPL-MCNC: 0.83 NG/ML (ref ?–4)
CREAT BLD-MCNC: 1.06 MG/DL
DEPRECATED RDW RBC AUTO: 38 FL (ref 35.1–46.3)
EOSINOPHIL # BLD AUTO: 0.09 X10(3) UL (ref 0–0.7)
EOSINOPHIL NFR BLD AUTO: 1 %
ERYTHROCYTE [DISTWIDTH] IN BLOOD BY AUTOMATED COUNT: 12.5 % (ref 11–15)
GLOBULIN PLAS-MCNC: 3.1 G/DL (ref 2.8–4.4)
GLUCOSE BLD-MCNC: 80 MG/DL (ref 70–99)
HCT VFR BLD AUTO: 46 %
HDLC SERPL-MCNC: 66 MG/DL (ref 40–59)
HGB BLD-MCNC: 15.7 G/DL
IMM GRANULOCYTES # BLD AUTO: 0.03 X10(3) UL (ref 0–1)
IMM GRANULOCYTES NFR BLD: 0.3 %
LDLC SERPL CALC-MCNC: 124 MG/DL (ref ?–100)
LYMPHOCYTES # BLD AUTO: 2.31 X10(3) UL (ref 1–4)
LYMPHOCYTES NFR BLD AUTO: 24.4 %
M PROTEIN MFR SERPL ELPH: 7.1 G/DL (ref 6.4–8.2)
MCH RBC QN AUTO: 28.7 PG (ref 26–34)
MCHC RBC AUTO-ENTMCNC: 34.1 G/DL (ref 31–37)
MCV RBC AUTO: 84.1 FL
MONOCYTES # BLD AUTO: 0.84 X10(3) UL (ref 0.1–1)
MONOCYTES NFR BLD AUTO: 8.9 %
NEUTROPHILS # BLD AUTO: 6.15 X10 (3) UL (ref 1.5–7.7)
NEUTROPHILS # BLD AUTO: 6.15 X10(3) UL (ref 1.5–7.7)
NEUTROPHILS NFR BLD AUTO: 65.1 %
NONHDLC SERPL-MCNC: 145 MG/DL (ref ?–130)
OSMOLALITY SERPL CALC.SUM OF ELEC: 282 MOSM/KG (ref 275–295)
PATIENT FASTING Y/N/NP: NO
PATIENT FASTING Y/N/NP: NO
PLATELET # BLD AUTO: 175 10(3)UL (ref 150–450)
POTASSIUM SERPL-SCNC: 4.2 MMOL/L (ref 3.5–5.1)
RBC # BLD AUTO: 5.47 X10(6)UL
SODIUM SERPL-SCNC: 135 MMOL/L (ref 136–145)
T4 FREE SERPL-MCNC: 0.9 NG/DL (ref 0.8–1.7)
TRIGL SERPL-MCNC: 105 MG/DL (ref 30–149)
TSI SER-ACNC: 0.82 MIU/ML (ref 0.36–3.74)
VLDLC SERPL CALC-MCNC: 21 MG/DL (ref 0–30)
WBC # BLD AUTO: 9.5 X10(3) UL (ref 4–11)

## 2021-01-21 PROCEDURE — 3079F DIAST BP 80-89 MM HG: CPT | Performed by: FAMILY MEDICINE

## 2021-01-21 PROCEDURE — 84443 ASSAY THYROID STIM HORMONE: CPT

## 2021-01-21 PROCEDURE — 3008F BODY MASS INDEX DOCD: CPT | Performed by: FAMILY MEDICINE

## 2021-01-21 PROCEDURE — 80053 COMPREHEN METABOLIC PANEL: CPT

## 2021-01-21 PROCEDURE — 85025 COMPLETE CBC W/AUTO DIFF WBC: CPT

## 2021-01-21 PROCEDURE — 99214 OFFICE O/P EST MOD 30 MIN: CPT | Performed by: FAMILY MEDICINE

## 2021-01-21 PROCEDURE — 3074F SYST BP LT 130 MM HG: CPT | Performed by: FAMILY MEDICINE

## 2021-01-21 PROCEDURE — 84439 ASSAY OF FREE THYROXINE: CPT

## 2021-01-21 PROCEDURE — 71046 X-RAY EXAM CHEST 2 VIEWS: CPT | Performed by: FAMILY MEDICINE

## 2021-01-21 PROCEDURE — 36415 COLL VENOUS BLD VENIPUNCTURE: CPT

## 2021-01-21 PROCEDURE — 80061 LIPID PANEL: CPT

## 2021-01-21 RX ORDER — ESCITALOPRAM OXALATE 20 MG/1
20 TABLET ORAL DAILY
Qty: 90 TABLET | Refills: 3 | Status: SHIPPED | OUTPATIENT
Start: 2021-01-21

## 2021-01-21 RX ORDER — BUPROPION HYDROCHLORIDE 150 MG/1
150 TABLET ORAL DAILY
Qty: 90 TABLET | Refills: 3 | Status: SHIPPED | OUTPATIENT
Start: 2021-01-21

## 2021-01-21 RX ORDER — MULTIVITAMIN
1 TABLET ORAL DAILY
COMMUNITY

## 2021-01-21 NOTE — PATIENT INSTRUCTIONS
Thank you for choosing Jessica Bernal MD at [place] [address]  To Do: Jose Prado  1. Please take meds as directed. [facility] is located in [address]. Monday, Tuesday & Friday – 8 a.m. to 4 p.m.   Wednesday, Thursday – 7 a.m. to 3 p outweigh those potential risks and we strive to make you healthier and to improve your quality of life.     Referrals, and Radiology Information:    If your insurance requires a referral to a specialist, please allow 5 business days to process your referral

## 2021-01-21 NOTE — TELEPHONE ENCOUNTER
Pt states he tested positive for Covid on Gildardo Priti at a clinic in [facility] to be tested. Pt states he had mild symptoms of sniffles, and chills, no fever, and a few days later pt states he lost his taste and smell.   Pt stats he was feeling better u

## 2021-01-21 NOTE — TELEPHONE ENCOUNTER
Pt states he tested + for COVID around Gildardo. He is calling bc he would like to speak to a nurse bc he is experiencing symptoms to discuss what he can do. He states he has headaches and chills.      I offered an appt with PCP but pt is not clear how franky

## 2021-01-21 NOTE — PROGRESS NOTES
HPI:    Patient ID: Luis Bridges is a 46year old male.     HPI  Mr. Ari Sanchez is a pleasant 49-year-old gentleman with history of anxiety here today for fatigue for the past several weeks which seem to have worsened recently associated with intermitten Allergies:No Known Allergies   PHYSICAL EXAM:   Physical Exam   Constitutional: No distress.    HENT:   Right Ear: Tympanic membrane, external ear and ear canal normal.   Left Ear: Tympanic membrane, external ear and ear canal normal.   Mouth/Throat: Or tablet 3     Sig: Take 1 tablet (20 mg total) by mouth daily. • buPROPion HCl ER, XL, 150 MG Oral Tablet 24 Hr 90 tablet 3     Sig: Take 1 tablet (150 mg total) by mouth daily.        Imaging & Referrals:  XR CHEST PA + LAT CHEST (CPT=71046)       [facility]#[id number]

## 2021-01-21 NOTE — TELEPHONE ENCOUNTER
Scheduled pt for 11:30 today per Dr. Eugene Dia. Pt expressed appreciation to Dr. Eugene Dia for seeing him today, pt is concerned why is feeling this way.

## 2021-05-06 ENCOUNTER — OFFICE VISIT (OUTPATIENT)
Dept: FAMILY MEDICINE CLINIC | Facility: CLINIC | Age: 51
End: 2021-05-06
Payer: COMMERCIAL

## 2021-05-06 VITALS
DIASTOLIC BLOOD PRESSURE: 80 MMHG | WEIGHT: 222 LBS | HEART RATE: 68 BPM | TEMPERATURE: 98 F | SYSTOLIC BLOOD PRESSURE: 110 MMHG | BODY MASS INDEX: 29.42 KG/M2 | RESPIRATION RATE: 16 BRPM | HEIGHT: 73 IN | OXYGEN SATURATION: 98 %

## 2021-05-06 DIAGNOSIS — H93.13 TINNITUS OF BOTH EARS: ICD-10-CM

## 2021-05-06 DIAGNOSIS — R20.9 COLD SENSATION OF SKIN: ICD-10-CM

## 2021-05-06 DIAGNOSIS — R25.1 TREMORS OF NERVOUS SYSTEM: Primary | ICD-10-CM

## 2021-05-06 PROCEDURE — 3008F BODY MASS INDEX DOCD: CPT | Performed by: FAMILY MEDICINE

## 2021-05-06 PROCEDURE — 3074F SYST BP LT 130 MM HG: CPT | Performed by: FAMILY MEDICINE

## 2021-05-06 PROCEDURE — 99215 OFFICE O/P EST HI 40 MIN: CPT | Performed by: FAMILY MEDICINE

## 2021-05-06 PROCEDURE — 3079F DIAST BP 80-89 MM HG: CPT | Performed by: FAMILY MEDICINE

## 2021-05-06 NOTE — PROGRESS NOTES
HPI/Subjective:   Patient ID: Giovana Sun is a 46year old male. HPI  Mr. Ella Alberto is a pleasant 52-year-old gentleman with history of sleep apnea on CPAP, depression, anxiety here today as he has been having tremors in both hands.   He is right-ha Negative for sore throat and trouble swallowing. Respiratory: Negative for cough and shortness of breath. Cardiovascular: Negative for chest pain and palpitations.    Gastrointestinal: Negative for abdominal pain, constipation, diarrhea, nausea and vo Neurological:      General: No focal deficit present. Mental Status: He is alert and oriented to person, place, and time. Cranial Nerves: No cranial nerve deficit. Motor: No weakness.       Gait: Gait normal.      Comments: Very subtle trem

## 2021-05-06 NOTE — PATIENT INSTRUCTIONS
Thank you for choosing Tej Duenas MD at [place] [address]  To Do: Maury Prado  1. Please see info  [email or web address] is located in [address]. Monday, Tuesday & Friday – 8 a.m. to 4 p.m. Wednesday, Thursday – 7 a.m. to 3 p.m.   The lab is potential risks and we strive to make you healthier and to improve your quality of life.     Referrals, and Radiology Information:    If your insurance requires a referral to a specialist, please allow 5 business days to process your referral request.    If and throat doctor (ENT or otolaryngologist). Your hearing may also be checked by a hearing specialist (audiologist). If you have hearing loss, wearing a hearing aid may help your tinnitus. When the cause can't be found, the tinnitus itself may be treated.

## 2021-11-09 ENCOUNTER — OFFICE VISIT (OUTPATIENT)
Dept: FAMILY MEDICINE CLINIC | Facility: CLINIC | Age: 51
End: 2021-11-09
Payer: COMMERCIAL

## 2021-11-09 VITALS
OXYGEN SATURATION: 97 % | RESPIRATION RATE: 16 BRPM | HEIGHT: 73 IN | DIASTOLIC BLOOD PRESSURE: 80 MMHG | WEIGHT: 234 LBS | HEART RATE: 58 BPM | TEMPERATURE: 97 F | SYSTOLIC BLOOD PRESSURE: 120 MMHG | BODY MASS INDEX: 31.01 KG/M2

## 2021-11-09 DIAGNOSIS — G89.29 CHRONIC BILATERAL LOW BACK PAIN WITHOUT SCIATICA: ICD-10-CM

## 2021-11-09 DIAGNOSIS — S39.012A STRAIN OF LUMBAR REGION, INITIAL ENCOUNTER: Primary | ICD-10-CM

## 2021-11-09 DIAGNOSIS — M54.50 CHRONIC BILATERAL LOW BACK PAIN WITHOUT SCIATICA: ICD-10-CM

## 2021-11-09 PROCEDURE — 96372 THER/PROPH/DIAG INJ SC/IM: CPT | Performed by: FAMILY MEDICINE

## 2021-11-09 PROCEDURE — 3074F SYST BP LT 130 MM HG: CPT | Performed by: FAMILY MEDICINE

## 2021-11-09 PROCEDURE — 99214 OFFICE O/P EST MOD 30 MIN: CPT | Performed by: FAMILY MEDICINE

## 2021-11-09 PROCEDURE — 3079F DIAST BP 80-89 MM HG: CPT | Performed by: FAMILY MEDICINE

## 2021-11-09 PROCEDURE — 3008F BODY MASS INDEX DOCD: CPT | Performed by: FAMILY MEDICINE

## 2021-11-09 RX ORDER — KETOROLAC TROMETHAMINE 30 MG/ML
60 INJECTION, SOLUTION INTRAMUSCULAR; INTRAVENOUS ONCE
Status: COMPLETED | OUTPATIENT
Start: 2021-11-09 | End: 2021-11-09

## 2021-11-09 RX ORDER — METHYLPREDNISOLONE 4 MG/1
TABLET ORAL
Qty: 1 EACH | Refills: 0 | Status: SHIPPED | OUTPATIENT
Start: 2021-11-09 | End: 2022-01-07

## 2021-11-09 RX ORDER — CYCLOBENZAPRINE HCL 10 MG
10 TABLET ORAL NIGHTLY
Qty: 30 TABLET | Refills: 1 | Status: SHIPPED | OUTPATIENT
Start: 2021-11-09 | End: 2022-01-07

## 2021-11-09 RX ADMIN — KETOROLAC TROMETHAMINE 60 MG: 30 INJECTION, SOLUTION INTRAMUSCULAR; INTRAVENOUS at 11:04:00

## 2021-11-09 NOTE — PROGRESS NOTES
Subjective:   Patient ID: Zach Archer is a 46year old male. HPI  Mr. Tonia Frazier is a pleasant 53-year-old gentleman with history of sleep apnea on CPAP, depression, anxiety here today for back pain which started over this weekend.   He is not exactl ER, XL, 150 MG Oral Tablet 24 Hr Take 1 tablet (150 mg total) by mouth daily. 90 tablet 3   • Turmeric 500 MG Oral Cap Take by mouth 2 (two) times daily.        Allergies:No Known Allergies    Objective:   Physical Exam  Constitutional:       General: He is

## 2021-11-09 NOTE — PATIENT INSTRUCTIONS
Thank you for choosing Errol Overton MD at [place] [address]  To Do: Ángel Prado  1. Please take meds as directed. [facility] is located in [address]. Monday, Tuesday & Friday – 8 a.m. to 4 p.m.   Wednesday, Thursday – 7 a.m. to 3 p outweigh those potential risks and we strive to make you healthier and to improve your quality of life.     Referrals, and Radiology Information:    If your insurance requires a referral to a specialist, please allow 5 business days to process your referral guidelines will help you care for your injury at home:  · When in bed, try to find a comfortable position. A firm mattress is best. Try lying flat on your back with pillows under your knees.  You can also try lying on your side with your knees bent up towar blood-thinner medicines. · Be careful if you are given prescription medicines, opioids, or medicine for muscle spasm. They can cause drowsiness, and affect your coordination, reflexes, and judgment.  Don't drive or operate heavy machinery when taking these

## 2021-11-11 ENCOUNTER — OFFICE VISIT (OUTPATIENT)
Dept: SURGERY | Facility: CLINIC | Age: 51
End: 2021-11-11
Payer: COMMERCIAL

## 2021-11-11 ENCOUNTER — HOSPITAL ENCOUNTER (OUTPATIENT)
Dept: GENERAL RADIOLOGY | Facility: HOSPITAL | Age: 51
Discharge: HOME | End: 2021-11-11
Attending: PHYSICIAN ASSISTANT
Payer: COMMERCIAL

## 2021-11-11 VITALS
HEIGHT: 73 IN | BODY MASS INDEX: 29.42 KG/M2 | DIASTOLIC BLOOD PRESSURE: 70 MMHG | WEIGHT: 222 LBS | HEART RATE: 60 BPM | SYSTOLIC BLOOD PRESSURE: 130 MMHG

## 2021-11-11 DIAGNOSIS — R25.8 CLONUS: ICD-10-CM

## 2021-11-11 DIAGNOSIS — M54.16 LUMBAR RADICULOPATHY: ICD-10-CM

## 2021-11-11 DIAGNOSIS — M51.26 BULGING LUMBAR DISC: ICD-10-CM

## 2021-11-11 DIAGNOSIS — G89.29 CHRONIC BILATERAL LOW BACK PAIN, UNSPECIFIED WHETHER SCIATICA PRESENT: ICD-10-CM

## 2021-11-11 DIAGNOSIS — M51.36 DDD (DEGENERATIVE DISC DISEASE), LUMBAR: ICD-10-CM

## 2021-11-11 DIAGNOSIS — M48.061 SPINAL STENOSIS OF LUMBAR REGION WITHOUT NEUROGENIC CLAUDICATION: ICD-10-CM

## 2021-11-11 DIAGNOSIS — G89.29 CHRONIC BILATERAL LOW BACK PAIN, UNSPECIFIED WHETHER SCIATICA PRESENT: Primary | ICD-10-CM

## 2021-11-11 DIAGNOSIS — M54.50 CHRONIC BILATERAL LOW BACK PAIN, UNSPECIFIED WHETHER SCIATICA PRESENT: Primary | ICD-10-CM

## 2021-11-11 DIAGNOSIS — M54.50 CHRONIC BILATERAL LOW BACK PAIN, UNSPECIFIED WHETHER SCIATICA PRESENT: ICD-10-CM

## 2021-11-11 PROCEDURE — 72110 X-RAY EXAM L-2 SPINE 4/>VWS: CPT | Performed by: PHYSICIAN ASSISTANT

## 2021-11-11 PROCEDURE — 3078F DIAST BP <80 MM HG: CPT | Performed by: PHYSICIAN ASSISTANT

## 2021-11-11 PROCEDURE — 3075F SYST BP GE 130 - 139MM HG: CPT | Performed by: PHYSICIAN ASSISTANT

## 2021-11-11 PROCEDURE — 99204 OFFICE O/P NEW MOD 45 MIN: CPT | Performed by: PHYSICIAN ASSISTANT

## 2021-11-11 PROCEDURE — 3008F BODY MASS INDEX DOCD: CPT | Performed by: PHYSICIAN ASSISTANT

## 2021-11-11 NOTE — H&P
Patient: John Paul Hsu  Medical Record Number: [id number]  YOB: 1970  PCP: Jeremy Villa MD    Referring Physician:   Reason for visit: Patient presents with:  New Patient: lumbar      Dear Dr. Linda Darling,  Thank you very much for request Frequent urination    • Heartburn    • MTHFR mutation    • DANILO on CPAP Edward PSG 6-18-16    AHI 20       Past Surgical History:   Procedure Laterality Date   • APPENDECTOMY     • OTHER SURGICAL HISTORY      broken finger      Family History   Problem Rela appropriately. SPINE:  Gait/Coordination: Intact, non-antalgic gait. Able to toe and heel balance on one foot bilaterally, reports equal strength.    ROM:    Lumbar Flexion   Reports pain   Lumbar Extension Reports pain   Lumbar Rotation  Pain free  Pal canal.  The paraspinal soft tissues are unremarkable.       At T12-L1, there is no significant normality.       At L1-2, there is a mild diffuse disc bulge with a superimposed left foraminal disc protrusion. There is mild facet arthropathy.  There is no s at 11:34       Approved by: Jessa Nicholas MD          MEDICAL DECISION MAKING:     ASSESSMENT and PLAN:  1. Chronic bilateral low back pain, unspecified whether sciatica present    2. Lumbar radiculopathy    3.  DDD (degenerative disc disease), lumbar    4

## 2021-11-11 NOTE — PROGRESS NOTES
Pt is here regarding: new patient, lumbar pain    Pain level at this moment:  2/10    What 1 location is your pain most intense:  State the pain is at the waist line down to the hips. States there no numbness/tingling.  The pain is contest. states this star

## 2021-12-15 ENCOUNTER — OFFICE VISIT (OUTPATIENT)
Dept: SURGERY | Facility: CLINIC | Age: 51
End: 2021-12-15
Payer: COMMERCIAL

## 2021-12-15 VITALS — HEART RATE: 60 BPM | SYSTOLIC BLOOD PRESSURE: 140 MMHG | DIASTOLIC BLOOD PRESSURE: 90 MMHG

## 2021-12-15 DIAGNOSIS — M47.816 LUMBAR FACET JOINT SYNDROME: Primary | ICD-10-CM

## 2021-12-15 DIAGNOSIS — M51.26 BULGE OF LUMBAR DISC WITHOUT MYELOPATHY: ICD-10-CM

## 2021-12-15 DIAGNOSIS — M47.816 LUMBAR FACET ARTHROPATHY: ICD-10-CM

## 2021-12-15 DIAGNOSIS — M51.36 DDD (DEGENERATIVE DISC DISEASE), LUMBAR: ICD-10-CM

## 2021-12-15 DIAGNOSIS — M48.061 LUMBAR FORAMINAL STENOSIS: ICD-10-CM

## 2021-12-15 DIAGNOSIS — Z86.69: ICD-10-CM

## 2021-12-15 PROCEDURE — 3080F DIAST BP >= 90 MM HG: CPT | Performed by: PHYSICIAN ASSISTANT

## 2021-12-15 PROCEDURE — 99214 OFFICE O/P EST MOD 30 MIN: CPT | Performed by: PHYSICIAN ASSISTANT

## 2021-12-15 PROCEDURE — 3077F SYST BP >= 140 MM HG: CPT | Performed by: PHYSICIAN ASSISTANT

## 2021-12-15 NOTE — PROGRESS NOTES
Pt is here regarding: follow up , lower back pain        Pt states he is doing better since it happen, has some pain in the middle of lower back when he is bending and twisting hurts him.  Has no numbness/tigling in his legs        Pt did the MEDROL pack he

## 2021-12-15 NOTE — PROGRESS NOTES
Patient: Justin Torres  Medical Record Number: [id number]  YOB: 1970  PCP: Hanna Camacho MD    Reason for visit: Lumbar follow up, imaging review    HISTORY OF CHIEF COMPLAINT:    Justin Torres is a very pleasant 46year old male wh items.   Currently on a Medrol Dosepak. Flexeril caused significant drowsiness.   Some mild improvement on the Medrol Dosepak  Past Medical History:   Diagnosis Date   • Anxiety    • Back pain    • Bad breath    • Decorative tattoo    • Depression    • [place] examination chair. HEENT: Normocephalic, atraumatic. RESPIRATORY RATE: Easy and Even  SKIN: Warm and dry  NEURO: Awake, alert and orientated. Speech fluent, comprehension intact, answering questions appropriately.      SPINE:  Gait/Coordination: Intact, cause minimal to mild left and minimal right foraminal and central spinal canal narrowing, progressed.      At L4-5, mild disc bulge with a tiny central disc protrusion, minimal endplate spurring and facet arthropathy cause mild foraminal narrowing, greater M48.061 Spinal stenosis of lumbar region without neurogenic claudication M51.26 Bulging lumbar disc M51.36 DDD (degenerative disc disease), lumbar*       PATIENT STATED HISTORY: (As transcribed by Technologist) Kim Angeles has had back pain for the past 12-13 flexion/extension views   - Ordered today:    - None  3. Referral:   - Physiatry:    -For management of low back pain, history of radicular symptoms.   Patient may be suffering from lumbar facet syndrome and may benefit from dedicated PT as well as facet in

## 2022-01-07 ENCOUNTER — TELEPHONE (OUTPATIENT)
Dept: NEUROLOGY | Facility: CLINIC | Age: 52
End: 2022-01-07

## 2022-01-07 ENCOUNTER — OFFICE VISIT (OUTPATIENT)
Dept: PHYSICAL MEDICINE AND REHAB | Facility: CLINIC | Age: 52
End: 2022-01-07
Payer: COMMERCIAL

## 2022-01-07 VITALS
BODY MASS INDEX: 31.14 KG/M2 | WEIGHT: 235 LBS | HEIGHT: 73 IN | DIASTOLIC BLOOD PRESSURE: 80 MMHG | HEART RATE: 77 BPM | OXYGEN SATURATION: 96 % | SYSTOLIC BLOOD PRESSURE: 120 MMHG

## 2022-01-07 DIAGNOSIS — M47.816 LUMBAR FACET ARTHROPATHY: Primary | ICD-10-CM

## 2022-01-07 PROCEDURE — 3008F BODY MASS INDEX DOCD: CPT | Performed by: PHYSICAL MEDICINE & REHABILITATION

## 2022-01-07 PROCEDURE — 99244 OFF/OP CNSLTJ NEW/EST MOD 40: CPT | Performed by: PHYSICAL MEDICINE & REHABILITATION

## 2022-01-07 PROCEDURE — 3079F DIAST BP 80-89 MM HG: CPT | Performed by: PHYSICAL MEDICINE & REHABILITATION

## 2022-01-07 PROCEDURE — 3074F SYST BP LT 130 MM HG: CPT | Performed by: PHYSICAL MEDICINE & REHABILITATION

## 2022-01-07 NOTE — H&P
[facility]    History and Physical    One [facility] Patient Status:  No patient class for patient encounter    1970 MRN [id number]   Location [facility], disc disease), lumbar    • Decorative tattoo    • Depression    • Frequent urination    • Heartburn    • Lumbar facet arthropathy    • Lumbar facet joint syndrome    • Lumbar foraminal stenosis    • MTHFR mutation    • DANILO on CPAP Edward PSG 6-18-16    AHI weight 235 lb (106.6 kg), SpO2 96 %. Nursing note and vitals reviewed. Constitutional: He appears well-developed and well-nourished. HENT:   Head: Normocephalic and atraumatic.    Eyes: Conjunctivae and EOM are normal.   Cardiovascular: Intact distal unremarkable, terminating at the L1 level. The paraspinal soft tissues are grossly unremarkable. At L1-2, mild disc bulge indents the anterior thecal sac and there is early facet arthropathy.  The findings cause slight diffuse narrowing without signific mild left foraminal stenosis. The findings are slightly progressed. This report was performed utilizing speech recognition software technology. Despite thorough proofreading, speech recognition errors could escape detection.  If a word or phrase is conf

## 2022-01-07 NOTE — TELEPHONE ENCOUNTER
[employer] for authorization of approval for  Per Dr. Becca oCffman 4-5, L5-S1 facet joint injections under fluoroscopy cpt codes 65628-36, 64412-22, 87978. CFS. Authorization # [id number] effective 01/14/2022 - 02/12/2022. Will inform Nursing.

## 2022-01-07 NOTE — PATIENT INSTRUCTIONS
We will call you to schedule procedure once it has been approved. If you do not hear from us within 3 days please call the office and ask if the injection has been approved.

## 2022-01-11 ENCOUNTER — TELEPHONE (OUTPATIENT)
Dept: PHYSICAL MEDICINE AND REHAB | Facility: CLINIC | Age: 52
End: 2022-01-11

## 2022-01-12 NOTE — TELEPHONE ENCOUNTER
Patient has been scheduled for BILATERAL 4-5, L5-S1 facet joint injections under fluoroscopy on 1/21/22 at the [facility] with Dr. Nicolasa Nolen.    -Anesthesia type: Local.  -If receiving MAC or IVC sedation patient will need to get COVID tested 3 days prior even if al

## 2022-01-18 ENCOUNTER — LAB ENCOUNTER (OUTPATIENT)
Dept: LAB | Age: 52
End: 2022-01-18
Attending: PHYSICAL MEDICINE & REHABILITATION
Payer: COMMERCIAL

## 2022-01-18 DIAGNOSIS — Z01.818 PRE-PROCEDURAL EXAMINATION: ICD-10-CM

## 2022-01-19 LAB — SARS-COV-2 RNA RESP QL NAA+PROBE: NOT DETECTED

## 2022-01-21 ENCOUNTER — OFFICE VISIT (OUTPATIENT)
Dept: SURGERY | Facility: CLINIC | Age: 52
End: 2022-01-21

## 2022-01-21 DIAGNOSIS — M47.816 LUMBAR FACET JOINT SYNDROME: Primary | ICD-10-CM

## 2022-01-21 PROCEDURE — 64494 INJ PARAVERT F JNT L/S 2 LEV: CPT | Performed by: PHYSICAL MEDICINE & REHABILITATION

## 2022-01-21 PROCEDURE — 64493 INJ PARAVERT F JNT L/S 1 LEV: CPT | Performed by: PHYSICAL MEDICINE & REHABILITATION

## 2022-01-22 NOTE — PROCEDURES
[place] [facility]    LUMBAR Z-JOINT/FACET INJECTIONS  NAME:  Blanca Green    MR #:    [id number] :  1970     PHYSICIAN:  Marjorie Tapia DO        Operative Report    DATE OF PROCEDURE: 2022   PREOPERATIVE DIAGNOSES: 1.  Lumbar Throughout the whole procedure, the patient's pulse oximetry and vital signs were monitored and they remained completely stable. Also, throughout the whole procedure, prior to injection of any medication, aspiration was performed.   No blood, fluid, or air

## 2022-02-22 ENCOUNTER — TELEPHONE (OUTPATIENT)
Dept: PHYSICAL MEDICINE AND REHAB | Facility: CLINIC | Age: 52
End: 2022-02-22

## 2022-02-22 NOTE — TELEPHONE ENCOUNTER
Condition update after Procedure. - Patient had bilateral L4-5 and L5-S1 Z-joint/facet injection on 01/21/22  with Dr. Trevor Smalls. - 50% relief. If pain is worse:  - Pain level prior to procedure:    couldn't tell  - Current pain level:  4, states having flare ups throughout the day, specially after being active. - Pain location: low back radiating to bilateral hips.   - Pain description: sharp/stabbing  - Current prescription pain medications/dosing: Tylenol PRN    - LOV: 1/7/2022 Genesis Lagos, DO    - NOV: Visit date not found   - Plan from LOV: none beer

## 2022-02-22 NOTE — TELEPHONE ENCOUNTER
Pt recently had an injection done by Dr Lauren Ohara however is still feeling pain and discomfort. Can you please follow up.   Thanks

## 2022-02-23 RX ORDER — BUPROPION HYDROCHLORIDE 150 MG/1
TABLET ORAL
Qty: 90 TABLET | Refills: 3 | Status: SHIPPED | OUTPATIENT
Start: 2022-02-23

## 2022-02-23 RX ORDER — ESCITALOPRAM OXALATE 20 MG/1
TABLET ORAL
Qty: 90 TABLET | Refills: 3 | Status: SHIPPED | OUTPATIENT
Start: 2022-02-23

## 2022-03-23 ENCOUNTER — OFFICE VISIT (OUTPATIENT)
Dept: PHYSICAL MEDICINE AND REHAB | Facility: CLINIC | Age: 52
End: 2022-03-23
Payer: COMMERCIAL

## 2022-03-23 ENCOUNTER — TELEPHONE (OUTPATIENT)
Dept: PHYSICAL MEDICINE AND REHAB | Facility: CLINIC | Age: 52
End: 2022-03-23

## 2022-03-23 VITALS
DIASTOLIC BLOOD PRESSURE: 84 MMHG | HEIGHT: 73 IN | HEART RATE: 71 BPM | OXYGEN SATURATION: 96 % | SYSTOLIC BLOOD PRESSURE: 140 MMHG | BODY MASS INDEX: 31.14 KG/M2 | WEIGHT: 235 LBS

## 2022-03-23 DIAGNOSIS — M53.3 PAIN OF BOTH SACROILIAC JOINTS: Primary | ICD-10-CM

## 2022-03-23 PROCEDURE — 3008F BODY MASS INDEX DOCD: CPT | Performed by: PHYSICAL MEDICINE & REHABILITATION

## 2022-03-23 PROCEDURE — 3079F DIAST BP 80-89 MM HG: CPT | Performed by: PHYSICAL MEDICINE & REHABILITATION

## 2022-03-23 PROCEDURE — 3077F SYST BP >= 140 MM HG: CPT | Performed by: PHYSICAL MEDICINE & REHABILITATION

## 2022-03-23 PROCEDURE — 99214 OFFICE O/P EST MOD 30 MIN: CPT | Performed by: PHYSICAL MEDICINE & REHABILITATION

## 2022-03-23 NOTE — PROGRESS NOTES
Progress note    C/C: low back pain    HPI: 61-year-old male presents for follow-up. Underwent bilateral L4-L5, L5-S1 facet joint injections under fluoroscopy, local, on 1/21/2022.  60% relief for about 1 month. Seemed to \"take the edge off\" initially and then worsened/aggravated without inciting event. Anytime doing anything physical, even bending and twisting, increases pain. Currently finishing his basement. Lower back consistently painful, usually 4/10, 6/10 by the end of the day. More painful with flareups. Having increased right lateral hip pain; finds himself favoring the left leg. Left lateral hip to a lesser degree. Sitting feels increased low back pain at the waistline. Increased pain on the right lower back crossing it over the left. If he does a sidelying stretch seems to help. Pertinent allergies: NKDA    Physical exam:  BMI 31.00 /84  HR 71  O2 sat 96%    Lumbar spine exam:    mild pain with lumbar flexion. mild pain with lumbar extension. No tenderness to palpation lumbar paraspinals. No ttp PSIS. 5/5 LE strength b/l  SILT b/l LE  2/4 quad, gastrocs reflexes b/l  Facet loading mildly painful b/l  Seated slump test negative  SLR negative  SI compression test negative  JESÚS test mildly painful b/l  Haseeb's test negative b/l    Imaging: No new imaging to review    Assessment and plan  1. Pain of both sacroiliac joints  2. Lumbar facet arthropathy    Recommend bilateral sacroiliac joint injection under fluoroscopy, local. Discussed risks of procedure, including bleeding, infection, nerve injury, HA. If symptoms persist consider repeating facet joint injections in the future. Might benefit from trial of duloxetine, but would have to discuss this with primary care.     Alo Broussard DO  Physical Medicine and [address] [place] [address]

## 2022-03-23 NOTE — TELEPHONE ENCOUNTER
Initiated authorization for Bilateral sacroiliac joint injection under fluoroscopy CPT 12885 dx:M53.3 to be done at [facility] with AIM online  Status: Approved with order #[id number] valid 3/28/22-4/26/22

## 2022-03-23 NOTE — TELEPHONE ENCOUNTER
Patient has been scheduled for Bilateral sacroiliac joint injection under fluoroscopy  on 4/18/22 at the [address] with Dr. Trell Rosa. -Anesthesia type: Local.  -If receiving MAC or IVC sedation patient will need to get COVID tested 3 days prior even if already vaccinated (order placed by [address]  -If scheduling EM and [facility] covid testing required for all procedures whether patient is vaccinated or not. -Patient informed not to eat or drink anything after midnight the night prior to the procedure, if being sedated. -Patient was advised that if he/she does receive the covid vaccine it needs to be at least 2 weeks before or after the injection. -Medications and allergies reviewed. -Patient reminded to hold NSAIDs (Ibuprofen, ASA 81, Aleve, Naproxen, Mobic etc.) for 3 days prior to [place]  if BMI is greater than 35. For Cervical injections only hold multivitamins, Vitamin E, Fish Oil, Phentermine (Lomaira) for 7 days prior to injection and NSAIDS.  mg to be held for 7 days prior to injections.  -If patient is receiving MAC/IVCS Phentermine Uriah Shawmut) will need to be held for 7 days prior to injection.  -If on blood thinner clearance has been received to hold this medication by provider.   -Patient informed he/she will need a  to and from procedure. -[facility] is located in the [facility] 1st floor. Patient may park in the yellow parking. Patient verbalized understanding and agrees with plan.  -----> Scheduled in Epic: Yes  -----> Scheduled in [employer]:  Yes

## 2022-04-18 ENCOUNTER — OFFICE VISIT (OUTPATIENT)
Dept: SURGERY | Facility: CLINIC | Age: 52
End: 2022-04-18

## 2022-04-18 DIAGNOSIS — M53.3 PAIN OF BOTH SACROILIAC JOINTS: Primary | ICD-10-CM

## 2022-04-18 PROCEDURE — 27096 INJECT SACROILIAC JOINT: CPT | Performed by: PHYSICAL MEDICINE & REHABILITATION

## 2022-04-18 NOTE — PROCEDURES
[address] JOINT INJECTIONS   NAME:  Suleiman Wright    MR #:    [id number] :  1970     PHYSICIAN:  Pete Mascorro DO        Operative Report    DATE OF PROCEDURE: 2022   PREOPERATIVE DIAGNOSES: 1. Pain of both sacroiliac joints        POSTOPERATIVE DIAGNOSES:   1. Pain of both sacroiliac joints        PROCEDURES: Bilateral SI Joint injections done under fluoroscopic guidance with contrast enhancement. SURGEON: Pete Mascorro DO   ANESTHESIA: Local   INDICATIONS:      OPERATIVE PROCEDURE:  Written consent was obtained from the patient. The patient was brought into the operating room and placed in the prone position on the fluoroscopy table with pillow underneath his abdomen. The patient's skin was cleaned and draped in a normal sterile fashion. Using AP fluoroscopy, bilateral SIJ was identified. At this point in time, the patient's skin was anesthetized with 1ml 1% PF lidocaine without epinephrine overlying the joints. Then, a 3.5 inch, 22-gauge spinal needle was inserted and directed towards the bilateral SIJ. When they felt to be in good position, Omnipaque-240 contrast was used to obtain a good arthrogram indicating correct needle placement. Then, aspiration was performed. No blood, fluid, or air was aspirated. Then, the joint was injected with a 2 cc solution of 1 cc of 40 mg/cc of Kenalog and 1 cc of 1% PF lidocaine without epinephrine. After this, the needles were removed. The patient's skin was cleaned. Band-Aids were applied. The patient was transferred to the cart and into [facility]. The patient was given discharge instructions and will follow up in the clinic as scheduled. Throughout the whole procedure, the patient's pulse oximetry and vital signs were monitored and they remained completely stable. Also, throughout the whole procedure, prior to injection of any medication, aspiration was performed.   No blood, fluid, or air was aspirated at anytime.

## 2022-05-11 ENCOUNTER — OFFICE VISIT (OUTPATIENT)
Dept: NEUROLOGY | Facility: CLINIC | Age: 52
End: 2022-05-11
Payer: COMMERCIAL

## 2022-05-11 VITALS
BODY MASS INDEX: 30 KG/M2 | WEIGHT: 229 LBS | DIASTOLIC BLOOD PRESSURE: 82 MMHG | HEART RATE: 70 BPM | SYSTOLIC BLOOD PRESSURE: 128 MMHG

## 2022-05-11 DIAGNOSIS — F41.1 GENERALIZED ANXIETY DISORDER: ICD-10-CM

## 2022-05-11 DIAGNOSIS — H93.13 TINNITUS OF BOTH EARS: ICD-10-CM

## 2022-05-11 DIAGNOSIS — G25.0 ESSENTIAL TREMOR: Primary | ICD-10-CM

## 2022-05-11 DIAGNOSIS — F10.20 UNCOMPLICATED ALCOHOL DEPENDENCE (HCC): ICD-10-CM

## 2022-05-11 PROCEDURE — 3074F SYST BP LT 130 MM HG: CPT | Performed by: OTHER

## 2022-05-11 PROCEDURE — 3079F DIAST BP 80-89 MM HG: CPT | Performed by: OTHER

## 2022-05-11 PROCEDURE — 99244 OFF/OP CNSLTJ NEW/EST MOD 40: CPT | Performed by: OTHER

## 2022-05-11 RX ORDER — LUTEIN 10 MG
TABLET ORAL DAILY
COMMUNITY

## 2022-05-16 ENCOUNTER — TELEPHONE (OUTPATIENT)
Dept: NEUROLOGY | Facility: CLINIC | Age: 52
End: 2022-05-16

## 2022-05-16 NOTE — TELEPHONE ENCOUNTER
MRI BRAIN (CPT=70551) Denial reason:    Your doctor told us that you have a twitching of one or more parts of your body. This twitching is called a tremor. This test should be used for certain types of tremors that suggest a brain problem. We reviewed the notes we have. The notes do not show that you have one of these types of tremors. Based on the information that we have, this test is not medically necessary. MRA BRAIN (NCD=80554) Denial reason:    Your doctor told us that you have tremors and ringing in the ears. Your doctor ordered a test that takes pictures of the blood vessels in your head. This test in needed when your doctor is looking for certain conditions. The might include a blockage of the blood vessel, bleeding or a stroke. We reviewed the notes we have, The notes do not show that your doctor is looking for any of these conditions. Based on the information we have, this test is not medically necessary. If ordering provider would like to discuss this case with an [facility] physician reviewer, they can contact [address] [place] [address] @ [phone]   Member ID# [id number]  Request ID: [id number]  For written appeals send to within 180 days from receipt of this letter:   Syed Chin and [facility] of [address] [facility] [address]: [phone]    Written appeal should include:  1.) A copy of the decision letter or Explanation of Benefits (EOB)  2.) The reference number or claim number. 3.) Written explanation of why you think we should change our decision and you may give any documents you want to add to make your point. Patient must sign designation authorization form.

## 2022-05-16 NOTE — TELEPHONE ENCOUNTER
Recommend appeal. Could appear for the MRA- ordered for tinnitus, not tremor. REason rule out vascular etiology, as per my note. More specifically, rule out aneurysm, vascular malformation, AC fistula. Neither test was ordered for tremor.

## 2022-05-17 NOTE — TELEPHONE ENCOUNTER
Spoke with [employer] representative, who states provider needs to call [employer] # [phone] and choose option #1, #1 to complete Peer to Peer when ready. No appointment needed.

## 2023-03-05 RX ORDER — BUPROPION HYDROCHLORIDE 150 MG/1
TABLET ORAL
Qty: 90 TABLET | Refills: 3 | Status: SHIPPED | OUTPATIENT
Start: 2023-03-05

## 2023-03-05 RX ORDER — ESCITALOPRAM OXALATE 20 MG/1
TABLET ORAL
Qty: 90 TABLET | Refills: 3 | Status: SHIPPED | OUTPATIENT
Start: 2023-03-05

## 2023-04-05 ENCOUNTER — TELEPHONE (OUTPATIENT)
Dept: FAMILY MEDICINE CLINIC | Facility: CLINIC | Age: 53
End: 2023-04-05

## 2023-04-05 RX ORDER — BUPROPION HYDROCHLORIDE 150 MG/1
150 TABLET ORAL DAILY
Qty: 90 TABLET | Refills: 0 | Status: SHIPPED | OUTPATIENT
Start: 2023-04-05 | End: 2023-04-10

## 2023-04-05 RX ORDER — ESCITALOPRAM OXALATE 20 MG/1
20 TABLET ORAL DAILY
Qty: 90 TABLET | Refills: 0 | Status: SHIPPED | OUTPATIENT
Start: 2023-04-05 | End: 2023-04-10

## 2023-04-05 NOTE — TELEPHONE ENCOUNTER
Pt states that he recd prescriptions from the mail order pharmacy, but they dont work as well as previously-different formula? ? Pt would like to know [facility] Medications are: escitalopram 20 mg and buproprion  mg. Please send for 90 day supply of each. Pt informed that insurance may not cover since recently recd from mail order.

## 2023-04-10 ENCOUNTER — TELEPHONE (OUTPATIENT)
Dept: FAMILY MEDICINE CLINIC | Facility: CLINIC | Age: 53
End: 2023-04-10

## 2023-04-10 RX ORDER — BUPROPION HYDROCHLORIDE 150 MG/1
150 TABLET ORAL DAILY
Qty: 90 TABLET | Refills: 0 | Status: SHIPPED | OUTPATIENT
Start: 2023-04-10

## 2023-04-10 RX ORDER — ESCITALOPRAM OXALATE 20 MG/1
20 TABLET ORAL DAILY
Qty: 90 TABLET | Refills: 0 | Status: SHIPPED | OUTPATIENT
Start: 2023-04-10

## 2023-04-10 NOTE — TELEPHONE ENCOUNTER
Patient requesting to send recent fill on escitalopram 20 MG Oral Tab and buPROPion  MG Oral Tablet 24 Hr to another pharmacy.  Patient requesting to send refills to [facility], please advise

## 2023-08-09 ENCOUNTER — LAB ENCOUNTER (OUTPATIENT)
Dept: LAB | Age: 53
End: 2023-08-09
Attending: FAMILY MEDICINE
Payer: COMMERCIAL

## 2023-08-09 DIAGNOSIS — Z13.0 SCREENING FOR ENDOCRINE, METABOLIC AND IMMUNITY DISORDER: ICD-10-CM

## 2023-08-09 DIAGNOSIS — Z12.5 SCREENING FOR MALIGNANT NEOPLASM OF PROSTATE: ICD-10-CM

## 2023-08-09 DIAGNOSIS — Z13.228 SCREENING FOR ENDOCRINE, METABOLIC AND IMMUNITY DISORDER: ICD-10-CM

## 2023-08-09 DIAGNOSIS — Z13.29 SCREENING FOR ENDOCRINE, METABOLIC AND IMMUNITY DISORDER: ICD-10-CM

## 2023-08-09 LAB
ALBUMIN SERPL-MCNC: 3.7 G/DL (ref 3.4–5)
ALBUMIN/GLOB SERPL: 1.1 {RATIO} (ref 1–2)
ALP LIVER SERPL-CCNC: 67 U/L
ALT SERPL-CCNC: 38 U/L
ANION GAP SERPL CALC-SCNC: 2 MMOL/L (ref 0–18)
AST SERPL-CCNC: 24 U/L (ref 15–37)
BASOPHILS # BLD AUTO: 0.04 X10(3) UL (ref 0–0.2)
BASOPHILS NFR BLD AUTO: 0.7 %
BILIRUB SERPL-MCNC: 0.4 MG/DL (ref 0.1–2)
BUN BLD-MCNC: 17 MG/DL (ref 7–18)
CALCIUM BLD-MCNC: 9 MG/DL (ref 8.5–10.1)
CHLORIDE SERPL-SCNC: 109 MMOL/L (ref 98–112)
CHOLEST SERPL-MCNC: 211 MG/DL (ref ?–200)
CO2 SERPL-SCNC: 29 MMOL/L (ref 21–32)
COMPLEXED PSA SERPL-MCNC: 1.13 NG/ML (ref ?–4)
CREAT BLD-MCNC: 1.06 MG/DL
EGFRCR SERPLBLD CKD-EPI 2021: 83 ML/MIN/1.73M2 (ref 60–?)
EOSINOPHIL # BLD AUTO: 0.18 X10(3) UL (ref 0–0.7)
EOSINOPHIL NFR BLD AUTO: 3.3 %
ERYTHROCYTE [DISTWIDTH] IN BLOOD BY AUTOMATED COUNT: 12.3 %
FASTING PATIENT LIPID ANSWER: NO
FASTING STATUS PATIENT QL REPORTED: NO
GLOBULIN PLAS-MCNC: 3.5 G/DL (ref 2.8–4.4)
GLUCOSE BLD-MCNC: 101 MG/DL (ref 70–99)
HCT VFR BLD AUTO: 46.3 %
HDLC SERPL-MCNC: 67 MG/DL (ref 40–59)
HGB BLD-MCNC: 15.6 G/DL
IMM GRANULOCYTES # BLD AUTO: 0.01 X10(3) UL (ref 0–1)
IMM GRANULOCYTES NFR BLD: 0.2 %
LDLC SERPL CALC-MCNC: 131 MG/DL (ref ?–100)
LYMPHOCYTES # BLD AUTO: 1.61 X10(3) UL (ref 1–4)
LYMPHOCYTES NFR BLD AUTO: 29.5 %
MCH RBC QN AUTO: 28.6 PG (ref 26–34)
MCHC RBC AUTO-ENTMCNC: 33.7 G/DL (ref 31–37)
MCV RBC AUTO: 84.8 FL
MONOCYTES # BLD AUTO: 0.67 X10(3) UL (ref 0.1–1)
MONOCYTES NFR BLD AUTO: 12.3 %
NEUTROPHILS # BLD AUTO: 2.95 X10 (3) UL (ref 1.5–7.7)
NEUTROPHILS # BLD AUTO: 2.95 X10(3) UL (ref 1.5–7.7)
NEUTROPHILS NFR BLD AUTO: 54 %
NONHDLC SERPL-MCNC: 144 MG/DL (ref ?–130)
OSMOLALITY SERPL CALC.SUM OF ELEC: 292 MOSM/KG (ref 275–295)
PLATELET # BLD AUTO: 196 10(3)UL (ref 150–450)
POTASSIUM SERPL-SCNC: 4.2 MMOL/L (ref 3.5–5.1)
PROT SERPL-MCNC: 7.2 G/DL (ref 6.4–8.2)
RBC # BLD AUTO: 5.46 X10(6)UL
SODIUM SERPL-SCNC: 140 MMOL/L (ref 136–145)
T4 FREE SERPL-MCNC: 0.8 NG/DL (ref 0.8–1.7)
TRIGL SERPL-MCNC: 75 MG/DL (ref 30–149)
TSI SER-ACNC: 1.27 MIU/ML (ref 0.36–3.74)
VLDLC SERPL CALC-MCNC: 13 MG/DL (ref 0–30)
WBC # BLD AUTO: 5.5 X10(3) UL (ref 4–11)

## 2023-08-09 PROCEDURE — 80053 COMPREHEN METABOLIC PANEL: CPT

## 2023-08-09 PROCEDURE — 85025 COMPLETE CBC W/AUTO DIFF WBC: CPT

## 2023-08-09 PROCEDURE — 84439 ASSAY OF FREE THYROXINE: CPT

## 2023-08-09 PROCEDURE — 80061 LIPID PANEL: CPT

## 2023-08-09 PROCEDURE — 84443 ASSAY THYROID STIM HORMONE: CPT

## 2023-08-09 PROCEDURE — 36415 COLL VENOUS BLD VENIPUNCTURE: CPT

## 2023-10-10 RX ORDER — ESCITALOPRAM OXALATE 20 MG/1
20 TABLET ORAL DAILY
Qty: 90 TABLET | Refills: 0 | Status: SHIPPED | OUTPATIENT
Start: 2023-10-10

## 2023-10-10 RX ORDER — BUPROPION HYDROCHLORIDE 150 MG/1
150 TABLET ORAL DAILY
Qty: 90 TABLET | Refills: 0 | Status: SHIPPED | OUTPATIENT
Start: 2023-10-10

## 2023-12-04 ENCOUNTER — OFFICE VISIT (OUTPATIENT)
Dept: FAMILY MEDICINE CLINIC | Facility: CLINIC | Age: 53
End: 2023-12-04
Payer: COMMERCIAL

## 2023-12-04 VITALS
RESPIRATION RATE: 16 BRPM | HEIGHT: 73 IN | SYSTOLIC BLOOD PRESSURE: 138 MMHG | HEART RATE: 66 BPM | WEIGHT: 230 LBS | DIASTOLIC BLOOD PRESSURE: 80 MMHG | BODY MASS INDEX: 30.48 KG/M2 | TEMPERATURE: 97 F | OXYGEN SATURATION: 98 %

## 2023-12-04 DIAGNOSIS — M54.41 ACUTE BILATERAL LOW BACK PAIN WITH BILATERAL SCIATICA: ICD-10-CM

## 2023-12-04 DIAGNOSIS — M54.42 ACUTE BILATERAL LOW BACK PAIN WITH BILATERAL SCIATICA: ICD-10-CM

## 2023-12-04 DIAGNOSIS — M54.2 NECK PAIN: ICD-10-CM

## 2023-12-04 DIAGNOSIS — R07.89 INTERMITTENT LEFT-SIDED CHEST PAIN: Primary | ICD-10-CM

## 2023-12-04 RX ORDER — BACLOFEN 10 MG/1
10 TABLET ORAL 3 TIMES DAILY
Qty: 30 TABLET | Refills: 0 | Status: SHIPPED | OUTPATIENT
Start: 2023-12-04 | End: 2023-12-14

## 2024-01-04 RX ORDER — ESCITALOPRAM OXALATE 20 MG/1
20 TABLET ORAL DAILY
Qty: 90 TABLET | Refills: 0 | Status: SHIPPED | OUTPATIENT
Start: 2024-01-04

## 2024-01-04 RX ORDER — BUPROPION HYDROCHLORIDE 150 MG/1
150 TABLET ORAL DAILY
Qty: 90 TABLET | Refills: 0 | Status: SHIPPED | OUTPATIENT
Start: 2024-01-04

## 2024-01-23 ENCOUNTER — OFFICE VISIT (OUTPATIENT)
Dept: PHYSICAL MEDICINE AND REHAB | Facility: CLINIC | Age: 54
End: 2024-01-23
Payer: COMMERCIAL

## 2024-01-23 VITALS — BODY MASS INDEX: 31 KG/M2 | OXYGEN SATURATION: 96 % | HEART RATE: 86 BPM | WEIGHT: 235 LBS

## 2024-01-23 DIAGNOSIS — M54.16 LUMBAR RADICULITIS: Primary | ICD-10-CM

## 2024-01-23 PROCEDURE — 99214 OFFICE O/P EST MOD 30 MIN: CPT | Performed by: PHYSICAL MEDICINE & REHABILITATION

## 2024-01-23 RX ORDER — METHYLPREDNISOLONE 4 MG/1
TABLET ORAL
Qty: 1 EACH | Refills: 0 | Status: SHIPPED | OUTPATIENT
Start: 2024-01-23

## 2024-01-23 NOTE — PATIENT INSTRUCTIONS
Walk at a pace faster than window shopping 4 or more days a week. Start at 15 minutes and increase by 5 minutes every week until you get to a distance or time that you are comfortable with.     Take the medrol dosepack as prescribed. Do not take ibuprofen while you are taking it.

## 2024-01-23 NOTE — PROGRESS NOTES
Progress note    C/C:   Chief Complaint   Patient presents with    Low Back Pain     04/18/22 Bilateral SIJ injections. 03/23/22 LOV. He was using his  abd has had low back and b/l hip pain for about 10 days. T/n in posterior LLE when sitting. Burning in L low back when sitting. Ibuprofen, tylenol, baclofen prn. Pain 6/10 when sitting. Pain lessens when standing. Sometimes he is unable to sit from the pain.       HPI: 54 year old male presents for follow up. Was doing reasonably well following bilateral SIJ. Renovated basement, was using OTC medications and alcohol to manage increase in lower back pain; back pain eased once he finished completing his basement. Awakens stiff and with the most pain in the morning. Has been working mostly sitting down.     Overall had intermittent baseline axial lower back pain with occasional flare ups us that would not last more than a day or two. More recently he had an aggravation of lower back pain after he used hi snowblower;has had about 10 days of lower back pain radiating down both legs, which prompted today's visit. Radiates into the left calf, sometimes tingling in the foot. Has been limited in sitting; with sitting would quickly radiate into the left leg. He intermittently stands during the history portion today as it is much more tolerable for him to be standing rather than sitting.    Pertinent allergies: No Known Allergies     Physical exam:  Pulse 86   Wt 235 lb (106.6 kg)   SpO2 96%   BMI 31.00 kg/m²      Lumbar spine exam:    No skin rash lumbar/upper sacral region  No pain with lumbar flexion. No pain with lumbar extension.  No tenderness to palpation bilateral lumbar paraspinals. No ttp bilateral PSIS.  5/5 LE strength b/l  SILT b/l LE  2/4 quad, gastrocs reflexes b/l  Facet loading negative  Seated slump test negative  SLR negative b/l    Imaging: No new imaging to review    Assessment and plan  Chronic bilateral lower back pain with recent  exacerbation; radiation into the left posterior thigh and calf, flexion provoked  Pain of both SIJ    His tolerance to sitting has improved over the past 10 days, and he may be towards the end of an acute episode of radicular low back and left leg pain. Recommend medrol dosepack. We discussed other treatment options, such as physical therapy for neutral to extension stabilization or a home exercise program consisting of walking, and weight loss. He has done a significant amount of PT in the past, prefers to exercise on his own. He will walk 15 minutes 4 or more days a week on a treadmill at moderate/fast pace, and increase by 5 minutes every week until he gets to a distance or time he is satisfied with that does not provoke back or leg pain. If symptoms worsen or fail to improve consider repeat MRI of the lumbar spine.     Sree Aguilar DO  Physical Medicine and Rehabilitation  [facility]

## 2024-04-06 RX ORDER — ESCITALOPRAM OXALATE 20 MG/1
20 TABLET ORAL DAILY
Qty: 90 TABLET | Refills: 0 | Status: SHIPPED | OUTPATIENT
Start: 2024-04-06

## 2024-04-06 RX ORDER — BUPROPION HYDROCHLORIDE 150 MG/1
150 TABLET ORAL DAILY
Qty: 90 TABLET | Refills: 0 | Status: SHIPPED | OUTPATIENT
Start: 2024-04-06

## 2024-07-09 RX ORDER — ESCITALOPRAM OXALATE 20 MG/1
20 TABLET ORAL DAILY
Qty: 90 TABLET | Refills: 0 | Status: SHIPPED | OUTPATIENT
Start: 2024-07-09

## 2024-07-09 RX ORDER — BUPROPION HYDROCHLORIDE 150 MG/1
150 TABLET ORAL DAILY
Qty: 90 TABLET | Refills: 0 | Status: SHIPPED | OUTPATIENT
Start: 2024-07-09

## 2024-10-09 RX ORDER — BUPROPION HYDROCHLORIDE 150 MG/1
150 TABLET ORAL DAILY
Qty: 90 TABLET | Refills: 0 | Status: SHIPPED | OUTPATIENT
Start: 2024-10-09

## 2024-10-09 RX ORDER — ESCITALOPRAM OXALATE 20 MG/1
20 TABLET ORAL DAILY
Qty: 90 TABLET | Refills: 0 | Status: SHIPPED | OUTPATIENT
Start: 2024-10-09

## 2025-01-06 RX ORDER — ESCITALOPRAM OXALATE 20 MG/1
20 TABLET ORAL DAILY
Qty: 90 TABLET | Refills: 0 | Status: SHIPPED | OUTPATIENT
Start: 2025-01-06

## 2025-01-06 RX ORDER — BUPROPION HYDROCHLORIDE 150 MG/1
150 TABLET ORAL DAILY
Qty: 90 TABLET | Refills: 0 | Status: SHIPPED | OUTPATIENT
Start: 2025-01-06

## 2025-02-18 ENCOUNTER — ORDER TRANSCRIPTION (OUTPATIENT)
Dept: ADMINISTRATIVE | Facility: HOSPITAL | Age: 55
End: 2025-02-18

## 2025-02-18 DIAGNOSIS — Z13.6 SCREENING FOR CARDIOVASCULAR CONDITION: Primary | ICD-10-CM

## 2025-02-22 ENCOUNTER — HOSPITAL ENCOUNTER (OUTPATIENT)
Dept: CT IMAGING | Facility: HOSPITAL | Age: 55
Discharge: HOME | End: 2025-02-22
Attending: FAMILY MEDICINE

## 2025-02-22 ENCOUNTER — LAB ENCOUNTER (OUTPATIENT)
Dept: LAB | Facility: HOSPITAL | Age: 55
End: 2025-02-22
Attending: FAMILY MEDICINE
Payer: COMMERCIAL

## 2025-02-22 VITALS
HEIGHT: 73 IN | BODY MASS INDEX: 32.07 KG/M2 | SYSTOLIC BLOOD PRESSURE: 138 MMHG | DIASTOLIC BLOOD PRESSURE: 90 MMHG | WEIGHT: 242 LBS

## 2025-02-22 DIAGNOSIS — Z13.6 SCREENING FOR CARDIOVASCULAR CONDITION: ICD-10-CM

## 2025-02-22 DIAGNOSIS — E55.9 VITAMIN D DEFICIENCY: ICD-10-CM

## 2025-02-22 DIAGNOSIS — Z12.5 SCREENING FOR MALIGNANT NEOPLASM OF PROSTATE: ICD-10-CM

## 2025-02-22 DIAGNOSIS — R03.0 ELEVATED BLOOD PRESSURE READING: ICD-10-CM

## 2025-02-22 LAB
ALBUMIN SERPL-MCNC: 4.6 G/DL (ref 3.2–4.8)
ALBUMIN/GLOB SERPL: 1.9 {RATIO} (ref 1–2)
ALP LIVER SERPL-CCNC: 70 U/L
ALT SERPL-CCNC: 47 U/L
ANION GAP SERPL CALC-SCNC: 7 MMOL/L (ref 0–18)
AST SERPL-CCNC: 28 U/L (ref ?–34)
BASOPHILS # BLD AUTO: 0.03 X10(3) UL (ref 0–0.2)
BASOPHILS NFR BLD AUTO: 0.6 %
BILIRUB SERPL-MCNC: 0.5 MG/DL (ref 0.3–1.2)
BUN BLD-MCNC: 17 MG/DL (ref 9–23)
CALCIUM BLD-MCNC: 9.8 MG/DL (ref 8.7–10.6)
CHLORIDE SERPL-SCNC: 103 MMOL/L (ref 98–112)
CHOLEST SERPL-MCNC: 203 MG/DL (ref ?–200)
CO2 SERPL-SCNC: 31 MMOL/L (ref 21–32)
COMPLEXED PSA SERPL-MCNC: 0.88 NG/ML (ref ?–4)
CREAT BLD-MCNC: 1.09 MG/DL
EGFRCR SERPLBLD CKD-EPI 2021: 79 ML/MIN/1.73M2 (ref 60–?)
EOSINOPHIL # BLD AUTO: 0.08 X10(3) UL (ref 0–0.7)
EOSINOPHIL NFR BLD AUTO: 1.7 %
ERYTHROCYTE [DISTWIDTH] IN BLOOD BY AUTOMATED COUNT: 12.3 %
FASTING PATIENT LIPID ANSWER: NO
FASTING STATUS PATIENT QL REPORTED: NO
GLOBULIN PLAS-MCNC: 2.4 G/DL (ref 2–3.5)
GLUCOSE BLD-MCNC: 129 MG/DL (ref 70–99)
GLUCOSE POC: 123 MG/DL (ref 70–100)
HCT VFR BLD AUTO: 45.2 %
HDL POC: 61 MG/DL (ref 40–60)
HDLC SERPL-MCNC: 59 MG/DL (ref 40–59)
HGB BLD-MCNC: 15.6 G/DL
IMM GRANULOCYTES # BLD AUTO: 0.01 X10(3) UL (ref 0–1)
IMM GRANULOCYTES NFR BLD: 0.2 %
LDL POC: 79 MG/DL (ref 0–130)
LDLC SERPL CALC-MCNC: 129 MG/DL (ref ?–100)
LYMPHOCYTES # BLD AUTO: 1.65 X10(3) UL (ref 1–4)
LYMPHOCYTES NFR BLD AUTO: 34.7 %
MCH RBC QN AUTO: 28.5 PG (ref 26–34)
MCHC RBC AUTO-ENTMCNC: 34.5 G/DL (ref 31–37)
MCV RBC AUTO: 82.6 FL
MONOCYTES # BLD AUTO: 0.51 X10(3) UL (ref 0.1–1)
MONOCYTES NFR BLD AUTO: 10.7 %
NEUTROPHILS # BLD AUTO: 2.48 X10 (3) UL (ref 1.5–7.7)
NEUTROPHILS # BLD AUTO: 2.48 X10(3) UL (ref 1.5–7.7)
NEUTROPHILS NFR BLD AUTO: 52.1 %
NONHDLC SERPL-MCNC: 144 MG/DL (ref ?–130)
OSMOLALITY SERPL CALC.SUM OF ELEC: 295 MOSM/KG (ref 275–295)
PLATELET # BLD AUTO: 195 10(3)UL (ref 150–450)
POTASSIUM SERPL-SCNC: 4.1 MMOL/L (ref 3.5–5.1)
PROT SERPL-MCNC: 7 G/DL (ref 5.7–8.2)
RBC # BLD AUTO: 5.47 X10(6)UL
SODIUM SERPL-SCNC: 141 MMOL/L (ref 136–145)
T4 FREE SERPL-MCNC: 1.2 NG/DL (ref 0.8–1.7)
TC/HDL RATIO: 3 (ref 0–5)
TOTAL CHOLESTEROL POC: 156 MG/DL (ref 0–200)
TRIGL SERPL-MCNC: 86 MG/DL (ref 30–149)
TRIGLYCERIDES POC: 80 MG/DL (ref 0–200)
TSI SER-ACNC: 0.6 UIU/ML (ref 0.55–4.78)
VIT D+METAB SERPL-MCNC: 79.2 NG/ML (ref 30–100)
VLDLC SERPL CALC-MCNC: 15 MG/DL (ref 0–30)
WBC # BLD AUTO: 4.8 X10(3) UL (ref 4–11)

## 2025-02-22 PROCEDURE — 80061 LIPID PANEL: CPT

## 2025-02-22 PROCEDURE — 82306 VITAMIN D 25 HYDROXY: CPT

## 2025-02-22 PROCEDURE — 84439 ASSAY OF FREE THYROXINE: CPT

## 2025-02-22 PROCEDURE — 80053 COMPREHEN METABOLIC PANEL: CPT

## 2025-02-22 PROCEDURE — 84443 ASSAY THYROID STIM HORMONE: CPT

## 2025-02-22 PROCEDURE — 36415 COLL VENOUS BLD VENIPUNCTURE: CPT

## 2025-02-22 PROCEDURE — 85025 COMPLETE CBC W/AUTO DIFF WBC: CPT

## 2025-02-22 NOTE — PROGRESS NOTES
Date of Service 2/22/2025    KENJI FORD  Date of Birth 1/2/1970    Patient Age: 55 year old    PCP: Brian Morel MD  [address]   [address]  [facility] [address]    Heart Scan Consult  Preliminary Heart Scan Score: 52.2    Has a lot of stress this past year. Also told MD that he has had some intermittent chest twinges Had a stress test ordered in 12/2023 and did not complete   Was diagnosed with sleep apnea years ago did not like the cpap and wore a mouth guard for a while now tries to sleep on back.   Pr enc to talk to doctor about both and also to monitor b/p and report to MD     Previous Screening  Heart Scan Completed Previously: No        Peripheral Vascular Scan Completed Previously: No          Risk Factors  Personal Risk Factors  Non-alterable Risk Factors: Age  Alterable Risk Factors: Abnormal Cholesterol;High Blood Pressure;Lack of exercise;Stress;Obstructive sleep apnea;Smoking      Body Mass Index  Body mass index is 31.93 kg/m².    Blood Pressure     /90     (Normal =< 120/80,  Elevated = 120-129/ >80,  High Stage1 130-139/80-89 , Stage2 >140/>90)    Lipid Profile  Patient was in fasting state: No    Cholesterol: 156, done on 2/22/2025.  HDL Cholesterol: 61, done on 2/22/2025.  LDL Cholesterol: 79, done on 2/22/2025.  TriGlycerides 80, done on 2/22/2025.    Cholesterol Goals  Value   Total  =< 200   HDL  = > 45 Men = > 55 Women   LDL   =< 100   Triglycerides  =< 150       Glucose and Hemoglobin A1C  Lab Results   Component Value Date     (A) 02/22/2025     (Normal Fasting Glucose < 100mg/dl )    Nurse Review  Risk factor information and results reviewed with Nurse: Yes    Recommended Follow Up:  Consult your physician regarding:: Final Heart Scan Report;Discuss potential for Incidental Finding;Discuss Potential for Score Variance      Recommendations for Change:  Nutrition Changes: Low Saturated Fat;Low Fat Dairy;Low Salt Eating;Increase Fiber         Exercise: Initiate  Program    Smoking Cessation:  (cigars occassionally)    Weight Management: Decrease Current Weight    Stress Management: Adopt Stress Management Techniques    Repeat Heart Scan: Discuss with your Physician;3 Years if Calcium Score is > 0.0              [facility] Recommended Resources:  Recommended Resources: Upcoming Classes, Medical Services and Health Library [email or web address];PV Screening  Recommended PV Screening: Abdomen;Carotids         Florina ASHRAF, RN        Please Contact the Nurse Heart Line with any Questions or Concerns [phone].

## 2025-02-26 PROBLEM — R35.0 INCREASED FREQUENCY OF URINATION: Status: ACTIVE | Noted: 2022-01-22

## 2025-02-26 PROBLEM — M54.9 BACKACHE: Status: ACTIVE | Noted: 2022-01-22

## 2025-03-13 ENCOUNTER — OFFICE VISIT (OUTPATIENT)
Dept: FAMILY MEDICINE CLINIC | Facility: CLINIC | Age: 55
End: 2025-03-13
Payer: COMMERCIAL

## 2025-03-13 VITALS
TEMPERATURE: 98 F | OXYGEN SATURATION: 98 % | WEIGHT: 239 LBS | HEART RATE: 72 BPM | BODY MASS INDEX: 32.37 KG/M2 | HEIGHT: 72 IN | SYSTOLIC BLOOD PRESSURE: 138 MMHG | RESPIRATION RATE: 16 BRPM | DIASTOLIC BLOOD PRESSURE: 78 MMHG

## 2025-03-13 DIAGNOSIS — F41.9 ANXIETY: ICD-10-CM

## 2025-03-13 DIAGNOSIS — R03.0 ELEVATED BLOOD PRESSURE READING: Primary | ICD-10-CM

## 2025-03-13 PROCEDURE — 99214 OFFICE O/P EST MOD 30 MIN: CPT | Performed by: FAMILY MEDICINE

## 2025-03-13 NOTE — PROGRESS NOTES
Subjective:   Patient ID: Marco Prado is a 55 year old male.    HPI  Mr. Prado is a very pleasant 55-year-old gentleman with history of depression, anxiety here today for his follow-up appointment.  Previous visit he had elevated blood pressure.  He had cut back considerably on drinking bourbon.  Blood pressure is back at baseline.  He has been trying to walk more with his wife.  He had laboratory test done which I reviewed which were basically normal.  He had a heart scan done which showed benign lung nodules and heart score of 57.  He will be registering for weight loss program.  He reports that his stress is better but is still dealing with his mom's estate.        I had reviewed past medical and family histories together with allergy and medication lists documented.    History/Other:   Review of Systems   Constitutional:  Negative for fatigue and fever.   Respiratory:  Negative for cough and shortness of breath.    Cardiovascular:  Negative for chest pain.   Gastrointestinal:  Negative for abdominal pain, diarrhea, nausea and vomiting.   Neurological:  Negative for dizziness.     Current Outpatient Medications   Medication Sig Dispense Refill    Na Sulfate-K Sulfate-Mg Sulf (SUPREP BOWEL PREP KIT) 17.5-3.13-1.6 GM/177ML Oral Solution Take as directed by physician.  Dispense one kit. 1 each 0    NON FORMULARY C-PM Compound ( CAPS)      NON FORMULARY C-AM Compound (CAPS)      Omeprazole 40 MG Oral Capsule Delayed Release Take 1 capsule (40 mg total) by mouth daily. 30 capsule 1    ESCITALOPRAM 20 MG Oral Tab TAKE 1 TABLET BY MOUTH EVERY DAY 90 tablet 0    BUPROPION  MG Oral Tablet 24 Hr TAKE 1 TABLET BY MOUTH EVERY DAY 90 tablet 0    Lutein 10 MG Oral Tab Take by mouth daily.      Probiotic Product (PROBIOTIC DAILY OR) Take by mouth daily.      Multiple Vitamin (MULTI-VITAMIN DAILY) Oral Tab Take 1 tablet by mouth daily.      Turmeric 500 MG Oral Cap Take by mouth 2 (two) times daily.        Allergies:Allergies[1]    Objective:   Physical Exam  Vitals reviewed.   Constitutional:       General: He is not in acute distress.  HENT:         Mouth/Throat:      Mouth: Mucous membranes are moist.      Pharynx: Oropharynx is clear. No oropharyngeal exudate or posterior oropharyngeal erythema.   Eyes:      General: No scleral icterus.     Conjunctiva/sclera: Conjunctivae normal.   Cardiovascular:      Rate and Rhythm: Normal rate and regular rhythm.      Heart sounds: Normal heart sounds. No murmur heard.  Pulmonary:      Effort: Pulmonary effort is normal. No respiratory distress.      Breath sounds: Normal breath sounds. No wheezing or rales.   Abdominal:      General: Bowel sounds are normal. There is no distension.     Musculoskeletal:      Cervical back: Neck supple.      Right lower leg: No edema.      Left lower leg: No edema.   Lymphadenopathy:      Cervical: No cervical adenopathy.   Skin:     General: Skin is warm.   Neurological:      General: No focal deficit present.      Mental Status: He is alert.   Psychiatric:         Mood and Affect: Mood normal.         Behavior: Behavior normal.      Assessment & Plan:   1. Elevated blood pressure reading    2. Anxiety    -Recent elevated blood pressure secondary to anxiety.  Agree with efforts to reduce alcohol intake as I think that this has been helpful for him.  Continue with efforts to improve health by being active and eating healthier which I think he will will be doing  - Reviewed heart scan results and I told him that lifestyle modifications would be the best approach at this point which she agrees  Follow-up as scheduled or as needed      This note was prepared using Dragon Medical voice recognition dictation software. As a result errors may occur. When identified these errors have been corrected. While every attempt is made to correct errors during dictation discrepancies may still exist            No orders of the defined types were placed in  this encounter.      Meds This Visit:  Requested Prescriptions      No prescriptions requested or ordered in this encounter       Imaging & Referrals:  None         [1] No Known Allergies

## 2025-03-13 NOTE — PATIENT INSTRUCTIONS
Thank you for choosing Brian Morel MD at [facility]  To Do: Marco Prado  1. High Blood pressure  What Is a Normal Blood Pressure?  The Joint National Committee on Prevention, Detection, Evaluation, and Treatment of High Blood Pressure has classified blood pressure measurements into several categories:  Normal blood pressure is systolic pressure less than 120 and diastolic pressure less than 80 mmHg.   \"Prehypertension\" is systolic pressure of 120-139 or diastolic pressure of 80-89 mmHg.   Stage 1 Hypertension is blood pressure greater than systolic pressure of 140-159 or diastolic pressure of 90-99 mmHg or greater.   Stage 2 Hypertension is systolic pressure of 160 or greater or diastolic pressure of 100 or greater.  What Health Problems Are Associated With High Blood Pressure?  Several potentially serious health conditions are linked to high blood pressure, including:  Atherosclerosis: a disease of the arteries caused by a buildup of plaque, or fatty material, on the inside walls of the blood vessels; hypertension contributes to this buildup by putting added stress and force on the artery walls.   Heart Disease: Heart failure (the heart is not strong enough to pump blood adequately), ischemic heart disease (the heart tissue doesn't get enough blood), and hypertensive hypertrophic cardiomyopathy (thickened, abnormally functioning heart muscle) are all associated with high blood pressure.   Kidney Disease: Hypertension can damage the blood vessels and filters in the kidneys, so that the kidneys cannot excrete waste properly.   Stroke: Hypertension can lead to stroke, either by contributing to the process of atherosclerosis (which can lead to blockages and/or clots), or by weakening the blood vessel wall and causing it to rupture.   Eye Disease: Hypertension can damage the very small blood vessels in the retina.  Bleeding from the aorta, the large blood vessel that supplies blood to the abdomen,  pelvis, and legs   Heart failure   Poor blood supply to the legs  Erectile Dysfunction  Problems with your vision    Overview  \"Blood pressure\" is the force of blood pushing against the walls of the arteries as the heart pumps blood. If this pressure rises and stays high over time, it can damage the body in many ways.  About 1 in 3 adults in the [place] has HBP. The condition itself usually has no signs or symptoms. You can have it for years without knowing it. During this time, though, HBP can damage your heart, blood vessels, kidneys, and other parts of your body.  Knowing your blood pressure numbers is important, even when you're feeling fine. If your blood pressure is normal, you can work with your health care team to keep it that way. If your blood pressure is too high, treatment may help prevent damage to your body's organs.    By [facility] staff   DASH stands for Dietary Approaches to Stop Hypertension. The DASH diet is a lifelong approach to healthy eating that's designed to help treat or prevent high blood pressure (hypertension). The DASH diet encourages you to reduce the sodium in your diet and eat a variety of foods rich in nutrients that help lower blood pressure, such as potassium, calcium and magnesium.   By following the DASH diet, you may be able to reduce your blood pressure by a few points in just two weeks. Over time, your blood pressure could drop by eight to 14 points, which can make a significant difference in your health risks.   DASH DIET  The low-salt Dietary Approaches to Stop Hypertension (DASH) diet is proven to help lower blood pressure. Its effects on blood pressure are sometimes seen within a few weeks.  This diet is not only rich in important nutrients and fiber, but it also includes foods that contain far more potassium (4,700 milligrams (mg)/day), calcium (1,250 mg/day), and magnesium (500 mg/day) and much less sodium (salt) than the typical American diet.  Limit sodium to  no more than 2,300 mg a day (eating only 1,500 mg a day is an even better goal).   Reduce saturated fat to no more than 6% of daily calories and total fat to 27% of daily calories. Low-fat dairy products appear to be especially beneficial for lowering systolic blood pressure.   When choosing fats, select monounsaturated oils, such as olive or canola oils.   Choose whole grains over white flour or pasta products.   Choose fresh fruits and vegetables every day. Many of these foods are rich in potassium, fiber, or both.   Eat nuts, seeds, or legumes (dried beans or peas) daily.   Choose modest amounts of protein (no more than 18% of total daily calories). Fish, skinless poultry, and soy products are the best protein sources.   Other daily nutrient goals in the DASH diet include limiting carbohydrates to 55% of daily calories and dietary cholesterol to 150 mg. Try to get at least 30 grams (g) of daily fiber.    Grains (6 to 8 servings a day)  Grains include bread, cereal, rice and pasta. Examples of one serving of grains include 1 slice whole-wheat bread, 1 ounce (oz.) dry cereal, or 1/2 cup cooked cereal, rice or pasta.   Focus on whole grains because they have more fiber and nutrients than do refined grains. For instance, use brown rice instead of white rice, whole-wheat pasta instead of regular pasta and whole-grain bread instead of white bread. Look for products labeled \"100 percent whole grain\" or \"100 percent whole wheat.\"   Grains are naturally low in fat, so avoid spreading on butter or adding cream and cheese sauces.   Vegetables (4 to 5 servings a day)  Tomatoes, carrots, broccoli, sweet potatoes, greens and other vegetables are full of fiber, vitamins, and such minerals as potassium and magnesium. Examples of one serving include 1 cup raw leafy green vegetables or 1/2 cup cut-up raw or cooked vegetables.   Don't think of vegetables only as side dishes -- a hearty blend of vegetables served over brown rice or  whole-wheat noodles can serve as the main dish for a meal.   Fresh or frozen vegetables are both good choices. When buying frozen and canned vegetables, choose those labeled as low sodium or without added salt.   To increase the number of servings you fit in daily, be creative. In a stir-armstrong, for instance, cut the amount of meat in half and double up on the vegetables.   Fruits (4 to 5 servings a day)  Many fruits need little preparation to become a healthy part of a meal or snack. Like vegetables, they're packed with fiber, potassium and magnesium and are typically low in fat -- exceptions include avocados and coconuts. Examples of one serving include 1 medium fruit or 1/2 cup fresh, frozen or canned fruit.   Have a piece of fruit with meals and one as a snack, then round out your day with a dessert of fresh fruits topped with a splash of low-fat yogurt.   Leave on edible peels whenever possible. The peels of apples, pears and most fruits with pits add interesting texture to recipes and contain healthy nutrients and fiber.   Remember that citrus fruits and juice, such as grapefruit, can interact with certain medications, so check with your doctor or pharmacist to see if they're OK for you.   Dairy (2 to 3 servings a day)  Milk, yogurt, cheese and other dairy products are major sources of calcium, vitamin D and protein. But the key is to make sure that you choose dairy products that are low-fat or fat-free because otherwise they can be a major source of fat. Examples of one serving include 1 cup skim or 1% milk, 1 cup yogurt or 1 1/2 oz. cheese.   Low-fat or fat-free frozen yogurt can help you boost the amount of dairy products you eat while offering a sweet treat. Add fruit for a healthy twist.   If you have trouble digesting dairy products, choose lactose-free products or consider taking an over-the-counter product that contains the enzyme lactase, which can reduce or prevent the symptoms of lactose intolerance.    Go easy on regular and even fat-free cheeses because they are typically high in sodium.   Lean meat, poultry and fish (6 or fewer servings a day)  Meat can be a rich source of protein, B vitamins, iron and zinc. But because even lean varieties contain fat and cholesterol, don't make them a mainstay of your diet -- cut back typical meat portions by one-third or one-half and pile on the vegetables instead. Examples of one serving include 1 oz. cooked skinless poultry, seafood or lean meat, 1 egg, or 1 oz. water-packed, no-salt-added canned tuna.   Trim away skin and fat from meat and then broil, grill, roast or poach instead of frying.   Eat heart-healthy fish, such as salmon, herring and tuna. These types of fish are high in omega-3 fatty acids, which can help lower your total cholesterol.   Nuts, seeds and legumes (4 to 5 servings a week)   Almonds, sunflower seeds, kidney beans, peas, lentils and other foods in this family are good sources of magnesium, potassium and protein. They're also full of fiber and phytochemicals, which are plant compounds that may protect against some cancers and cardiovascular disease. Serving sizes are small and are intended to be consumed weekly because these foods are high in calories. Examples of one serving include 1/3 cup (1 1/2 oz.) nuts, 2 tablespoons seeds or 1/2 cup cooked beans or peas.   Nuts sometimes get a bad rap because of their fat content, but they contain healthy types of fat -- monounsaturated fat and omega-3 fatty acids. They're high in calories, however, so eat them in moderation. Try adding them to stir-fries, salads or cereals.   Soybean-based products, such as tofu and tempeh, can be a good alternative to meat because they contain all of the amino acids your body needs to make a complete protein, just like meat. They also contain isoflavones, a type of natural plant compound (phytochemical) that has been shown to have some health benefits.   Fats and oils (2 to  3 servings a day)  Fat helps your body absorb essential vitamins and helps your body's immune system. But too much fat increases your risk of heart disease, diabetes and obesity. The DASH diet strives for a healthy balance by providing 30 percent or less of daily calories from fat, with a focus on the healthier unsaturated fats. Examples of one serving include 1 teaspoon soft margarine, 1 tablespoon low-fat mayonnaise or 2 tablespoons light salad dressing.   Saturated fat and trans fat are the main dietary culprits in raising your blood cholesterol and increasing your risk of coronary artery disease. DASH helps keep your daily saturated fat to less than 10 percent of your total calories by limiting use of meat, butter, cheese, whole milk, cream and eggs in your diet, along with foods made from lard, solid shortenings, and palm and coconut oils.   Avoid trans fat, commonly found in such processed foods as crackers, baked goods and fried items.   Read food labels on margarine and salad dressing so that you can choose those that are lowest in saturated fat and free of trans fat.   Sweets (5 or fewer a week)  You don't have to banish sweets entirely while following the DASH diet -- just go easy on them. Examples of one serving include 1 tablespoon sugar, jelly or jam, 1/2 cup sorbet or 1 cup (8 oz.) lemonade.   When you eat sweets, choose those that are fat-free or low-fat, such as sorbets, fruit ices, jelly beans, hard candy, cassidy crackers or low-fat cookies.   Artificial sweeteners such as aspartame (NutraSweet, Equal) and sucralose (Splenda) may help satisfy your sweet tooth while sparing the sugar. But remember that you still must use them sensibly. It's OK to swap a diet cola for a regular cola, but not in place of a more nutritious beverage such as low-fat milk or even plain water.   Cut back on added sugar, which has no nutritional value but can pack on calories.   DASH diet: Alcohol and caffeine  Drinking too  much alcohol can increase blood pressure. The DASH diet recommends that men limit alcohol to two or fewer drinks a day and women one or less.   The DASH diet doesn't address caffeine consumption. The influence of caffeine on blood pressure remains unclear. But caffeine can cause your blood pressure to rise at least temporarily. If you already have high blood pressure or if you think caffeine is affecting your blood pressure, talk to your doctor about your caffeine consumption.   The DASH diet is not designed to promote weight loss, but it can be used as part of an overall weight-loss strategy. The DASH diet is based on a diet of about 2,000 calories a day. If you're trying to lose weight, though, you may want to eat around 1,600 a day. You may need to adjust your serving goals based on your health or individual circumstances -- something your health care team can help you decide.   Tips to cut back on sodium  The foods at the core of the DASH diet are naturally low in sodium. So just by following the DASH diet, you're likely to reduce your sodium intake. You also can cut back on sodium in your diet by:   Using sodium-free spices or flavorings with your food instead of salt   Not adding salt when cooking rice, pasta or hot cereal   Rinsing canned foods to remove some of the sodium   Buying foods labeled \"no salt added,\" \"sodium-free,\" \"low sodium\" or \"very low sodium\"   One teaspoon of table salt has about 2,300 mg of sodium, and 2/3 teaspoon of table salt has about 1,500 mg of sodium. When you read food labels, you may be surprised at just how much sodium some processed foods contain. Even low-fat soups, canned vegetables, ready-to-eat cereals and sliced turkey from the local deli -- all foods you may have considered healthy -- often have lots of sodium.   You may not notice a difference in taste when you choose low-sodium food and beverages. If things seem too bland, gradually introduce low-sodium foods and cut  back on table salt until you reach your sodium goal. That'll give your palate time to adjust. It can take several weeks for your taste buds to get used to less salty foods.      Call [phone] to schedule the appointment.   Please signup for [employer], which is electronic access to your record if you have not done so.  All your results will post on there.  [email or web address]   You can NOW use [employer] to book your appointments with us, or consider using open access scheduling which is through the [place] website [email or web address] and type in Brian Morel MD and follow the links for \"Schedule Online Now\"    To schedule Imaging or tests at [facility] call Central Scheduling [phone], Go to [facility] ER Building (For example: CT scans, X rays, Ultrasound, MRI)  Cardiac Testing in ER building Building A second floor Cardiac Testing [phone] (For example: Holter Monitor, Cardiac Stress tests,Event Monitor, or 2D Echocardiograms)  [facility] call [phone] usually in [facility]  Walk in Clinic in [place] at [address] Mon-Fri at 8am-7:30 p.m., and Sat/Sun 9:00a.m.-4:30 p.m.  Also at [address] [place] [address]  Call [phone] for info     Please call our office about any questions regarding your treatment/medicines/tests as a result of today's visit.  For your safety, read the entire package insert of all medicines prescribed to you and be aware of all of the risks of treatment even beyond those discussed today.  All therapies have potential risk of harm or side effects or medication interactions.  It is your duty and for your safety to discuss with the pharmacist and our office with questions, and to notify us and stop treatment if problems arise, but know that our intention is that the benefits outweigh those potential risks and we strive to make you healthier and to improve your quality of life.    Referrals, and Radiology Information:    If your insurance requires a  referral to a specialist, please allow 5 business days to process your referral request.    If Brian Morel MD orders a CT or MRI, it may take up to 10 business days to receive approval from your insurance company. Once our office has called informing you that the insurance company approved your testing, please call Central Scheduling at [phone]  Please allow our office 5 business days to contact you regarding any testing results.    Refill policies:   Allow 3 business days for refills; controlled substances may take longer and must be picked up from the office in person.  Narcotic medications can only be filled in 30 day increments and must be refilled at an office visit only.  If your prescription is due for a refill, you may be due for a follow-up appointment.  We cannot refill your maintenance medications at a preventative wellness visit.  To best provide you care, patients receiving maintenance medications need to be seen at least twice a year.

## 2025-04-07 RX ORDER — ESCITALOPRAM OXALATE 20 MG/1
20 TABLET ORAL DAILY
Qty: 90 TABLET | Refills: 0 | Status: SHIPPED | OUTPATIENT
Start: 2025-04-07

## 2025-04-07 RX ORDER — BUPROPION HYDROCHLORIDE 150 MG/1
150 TABLET ORAL DAILY
Qty: 90 TABLET | Refills: 0 | Status: SHIPPED | OUTPATIENT
Start: 2025-04-07

## 2025-07-09 RX ORDER — BUPROPION HYDROCHLORIDE 150 MG/1
150 TABLET ORAL DAILY
Qty: 90 TABLET | Refills: 0 | Status: SHIPPED | OUTPATIENT
Start: 2025-07-09

## 2025-07-09 RX ORDER — ESCITALOPRAM OXALATE 20 MG/1
20 TABLET ORAL DAILY
Qty: 90 TABLET | Refills: 0 | Status: SHIPPED | OUTPATIENT
Start: 2025-07-09

## 2025-07-09 NOTE — TELEPHONE ENCOUNTER
Name from pharmacy: Escitalopram Oxalate Oral Tablet 20 MG         Will file in chart as: ESCITALOPRAM 20 MG Oral Tab    Sig: TAKE 1 TABLET BY MOUTH EVERY DAY    Disp: 90 tablet    Refills: 0    Start: 7/7/2025    Class: Normal    Non-formulary    Last ordered: 3 months ago (4/7/2025) by Brian Morel MD    Last refill: 4/7/2025    Rx #: [id number]    Psychiatric Non-Scheduled (Anti-Anxiety) Dumhkq8607/07/2025 01:33 PM   Protocol Details In person appointment or virtual visit in the past 6 mos or appointment in next 3 mos    Depression Screening completed within the past 12 months    Medication is active on med list       Name from pharmacy: buPROPion HCl ER (XL) Oral Tablet Extended Release 24 Hour 150 MG         Will file in chart as: BUPROPION  MG Oral Tablet 24 Hr    Sig: TAKE 1 TABLET BY MOUTH EVERY DAY    Disp: 90 tablet    Refills: 0    Start: 7/7/2025    Class: Normal    Non-formulary    Last ordered: 3 months ago (4/7/2025) by Brian Morel MD    Last refill: 4/7/2025    Rx #: [id number]    Psychiatric Non-Scheduled (Anti-Anxiety) Colyed0907/07/2025 01:33 PM   Protocol Details In person appointment or virtual visit in the past 6 mos or appointment in next 3 mos    Depression Screening completed within the past 12 months    Medication is active on med list      To be filled at: [id number] [address] [facility] [address] [phone], [phone]

## 2025-07-10 ENCOUNTER — OFFICE VISIT (OUTPATIENT)
Dept: PHYSICAL MEDICINE AND REHAB | Facility: CLINIC | Age: 55
End: 2025-07-10
Payer: COMMERCIAL

## 2025-07-10 VITALS — HEIGHT: 72 IN | WEIGHT: 239 LBS | BODY MASS INDEX: 32.37 KG/M2

## 2025-07-10 DIAGNOSIS — M53.3 PAIN OF BOTH SACROILIAC JOINTS: Primary | ICD-10-CM

## 2025-07-10 DIAGNOSIS — M47.816 LUMBAR FACET ARTHROPATHY: ICD-10-CM

## 2025-07-10 PROCEDURE — 3008F BODY MASS INDEX DOCD: CPT | Performed by: PHYSICAL MEDICINE & REHABILITATION

## 2025-07-10 PROCEDURE — 99214 OFFICE O/P EST MOD 30 MIN: CPT | Performed by: PHYSICAL MEDICINE & REHABILITATION

## 2025-07-10 RX ORDER — METHYLPREDNISOLONE 4 MG/1
TABLET ORAL
Qty: 1 EACH | Refills: 0 | Status: SHIPPED | OUTPATIENT
Start: 2025-07-10

## 2025-07-10 NOTE — PATIENT INSTRUCTIONS
1) Start the medrol dosepack.   2) If doing reasonably well consider starting glucosamine/chondroitin sulfate supplements for at least a month or two.   3) Hold off on exercise for the next 1-2 weeks.   4) If no better in 2 weeks' time message or call the office; may need to consider updating imaging.

## 2025-07-10 NOTE — PROGRESS NOTES
The following individual(s) verbally consented to be recorded using ambient AI listening technology and understand that they can each withdraw their consent to this listening technology at any point by asking the clinician to turn off or pause the recording:    Patient name: Manuelterrance MARTINEZ Johan  Additional names:
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
## 2025-07-17 DIAGNOSIS — M47.816 LUMBAR FACET ARTHROPATHY: ICD-10-CM

## 2025-07-17 DIAGNOSIS — M53.3 PAIN OF BOTH SACROILIAC JOINTS: ICD-10-CM

## 2025-07-18 NOTE — TELEPHONE ENCOUNTER
Sent MCM to patient to clarify refill request for METHYLPREDNISOLONE 4 MG Oral Tablet Therapy Pack.    Per Dr. Aguilar's instructions from last office visit on 07/10/2025,     \"Previous steroid injections were effective, so oral steroids will be prescribed. He should avoid exercise for 1-2 weeks during steroid use, with a note provided for exercise restriction. Gradual activity increase is advised afterward. X-rays and possibly an updated MRI of the lumbar spine will be considered if there is no improvement in 2 weeks. He may need to also consider PT. \"

## 2025-07-22 RX ORDER — METHYLPREDNISOLONE 4 MG/1
TABLET ORAL
Refills: 0 | OUTPATIENT
Start: 2025-07-22

## 2025-07-22 NOTE — TELEPHONE ENCOUNTER
Per patient MCM,    \"I do want him to know that although the steroid helped some, there is still a lot of discomfort, and I want to discuss the next course of action. I'm not convinced PT will help, considering I started a workout program in April, and my back and hips are worse.\"    Dr. Aguilar, please advise on how to move forward. Thank you.

## 2025-07-23 NOTE — TELEPHONE ENCOUNTER
Recommend updating imaging and following up afterwards; ordered XR flex/ex, WB views; MRI lumbar spine.

## 2025-07-24 RX ORDER — DIAZEPAM 5 MG/1
TABLET ORAL
Qty: 2 TABLET | Refills: 0 | Status: SHIPPED | OUTPATIENT
Start: 2025-07-24

## 2025-07-24 NOTE — TELEPHONE ENCOUNTER
Patient is requesting an oral sedative prior to MRI.     Order pended per protocol, please notify writer upon signature to notify patient.

## 2025-08-07 ENCOUNTER — HOSPITAL ENCOUNTER (OUTPATIENT)
Dept: GENERAL RADIOLOGY | Age: 55
Discharge: HOME | End: 2025-08-07
Attending: PHYSICAL MEDICINE & REHABILITATION

## 2025-08-07 DIAGNOSIS — M53.3 PAIN OF BOTH SACROILIAC JOINTS: ICD-10-CM

## 2025-08-07 DIAGNOSIS — M47.816 LUMBAR FACET ARTHROPATHY: ICD-10-CM

## 2025-08-07 PROCEDURE — 72110 X-RAY EXAM L-2 SPINE 4/>VWS: CPT | Performed by: PHYSICAL MEDICINE & REHABILITATION

## 2025-08-19 ENCOUNTER — MED REC SCAN ONLY (OUTPATIENT)
Dept: PHYSICAL MEDICINE AND REHAB | Facility: CLINIC | Age: 55
End: 2025-08-19

## 2025-08-28 ENCOUNTER — TELEPHONE (OUTPATIENT)
Dept: PHYSICAL MEDICINE AND REHAB | Facility: CLINIC | Age: 55
End: 2025-08-28

## 2025-08-28 ENCOUNTER — OFFICE VISIT (OUTPATIENT)
Dept: PHYSICAL MEDICINE AND REHAB | Facility: CLINIC | Age: 55
End: 2025-08-28

## 2025-08-28 VITALS — HEIGHT: 72 IN | WEIGHT: 239 LBS | BODY MASS INDEX: 32.37 KG/M2

## 2025-08-28 DIAGNOSIS — M79.18 MYOFASCIAL PAIN: ICD-10-CM

## 2025-08-28 DIAGNOSIS — M54.16 LUMBAR RADICULITIS: Primary | ICD-10-CM

## 2025-08-28 DIAGNOSIS — M51.16 LUMBAR DISC HERNIATION WITH RADICULOPATHY: Primary | ICD-10-CM

## (undated) NOTE — MR AVS SNAPSHOT
[facility] [address]  [address] [facility] Kathryn Iniguez  [phone]               Thank you for choosing us for your health care visit with Ludivina Hoang MD.  We are glad to serve you and happy to provide you with t None      Allergies as of Jun 21, 2017     No Known Allergies                Today's Vital Signs     BP Pulse Height Weight BMI    124/80 mmHg 78 72\" 238 lb 32.27 kg/m2         Current Medications          This list is accurate as of: 6/21/17  9:46 AM. drinks, candies and desserts   Eat plenty of low-fat dairy products High fat meats and dairy   Choose whole grain products Foods high in sodium   Water is best for hydration Fast food.    Eat at home when possible     Tips for increasing your physical activ

## (undated) NOTE — LETTER
07/10/25    Marco Prado  :  1970    To Whom It May Concern:    This patient was seen in our office on 07/10/25. He should refrain from exercise for the next 1-2 weeks. After that may transition to body weight exercise for 1 week, and slowly increase activity.     If this office may be of further assistance, please do not hesitate to contact us.      Sincerely,        Sree Aguilar, DO

## (undated) NOTE — LETTER
09/09/20        Wilfred Root  [address] [facility] [address]      Dear Deisy Messer records indicate that you have outstanding lab work and or testing that was ordered for you and has not yet been completed:  Orders Placed This Encounte

## (undated) NOTE — LETTER
07/25/17        Reilly Bo  [address] [facility] [address]      Dear Marge Ray records indicate that you have outstanding lab work and or testing that was ordered for you and has not yet been completed:          COMP METABOLIC PANEL

## (undated) NOTE — LETTER
[facility]   Date:   1/23/2024     Name:   Marco Prado    YOB: 1970   MRN:   [id number]       WHERE IS YOUR PAIN NOW?  Abida the areas on your body where you feel the described sensations.  Use the appropriate symbol.  Abida the areas of radiation.  Include all affected areas.  Just to complete the picture, please draw in the face.     ACHE:  ^ ^ ^   NUMBNESS:  0000   PINS & NEEDLES:  = = = =                              ^ ^ ^                       0000              = = = =                                    ^ ^ ^                       0000            = = = =      BURNING:  XXXX   STABBING: ////                  XXXX                ////                         XXXX          ////     Please abida the line below indicating your degree of pain right now  with 0 being no pain 10 being the worst pain possible.                                         0             1             2              3             4              5              6              7             8             9             10         Patient Signature:

## (undated) NOTE — MR AVS SNAPSHOT
[facility] [address]  [address] [facility] Lesly Baig  [phone]               Thank you for choosing us for your health care visit with Carolyn Witt MD.  We are glad to serve you and happy to provide you with t Turmeric 500 MG Caps   Take by mouth 2 (two) times daily.                    Today's Orders     PSA    Complete by:  Jan 18, 2017 (Approximate)    Assoc Dx:  Decreased urine stream [R39.198]           Basic Metabolic Panel (8) [E]    Complete by:  Jan 1 authorization numbers or be assured that none are required. You can then schedule your appointment. Failure to obtain required authorization numbers can create reimbursement difficulties for you.     Assoc Dx:  DANILO (obstructive sleep apnea) [G47.33]

## (undated) NOTE — LETTER
[address] [facility]   Date:   3/23/2022     Name:   Ivana Nguyen    YOB: 1970   MRN:   [id number]       WHERE IS YOUR PAIN NOW? Abida the areas on your body where you feel the described sensations. Use the appropriate symbol. [place] the areas of radiation. Include all affected areas. Just to complete the picture, please draw in the face. ACHE:  ^ ^ ^   NUMBNESS:  0000   PINS & NEEDLES:  = = = =                              ^ ^ ^                       0000              = = = =                                    ^ ^ ^                       0000            = = = =      BURNING:  XXXX   STABBING: ////                  XXXX                ////                         XXXX          ////     Please abida the line below indicating your degree of pain right now  with 0 being no pain 10 being the worst pain possible.                                          0             1             2              3             4              5              6              7             8             9             10         Patient Signature:

## (undated) NOTE — LETTER
[address] [place] [address] Way   Date:   1/7/2022     Name:   Marisela Christian    YOB: 1970   MRN:   [id number]       WHERE IS YOUR PAIN NOW?   Lester the areas on your body where you feel the described s

## (undated) NOTE — MR AVS SNAPSHOT
[facility] [address]  [facility]  [phone]               Thank you for choosing us for your health care visit with Debbie Bazzi PA-C.   We are glad to serve you and happy to provide you and Sat/Sun 9am-430pm  Also at [address]  Call [phone] for info    • Please call our office about any questions regarding your treatment/medicines/tests as a result of today's visit.  For your safety, read the entire package insert of Exam - Established Patient with MD Som King [address] [facility] Leon Ortgea [address] [address]  [facility] [address]   [phone]            Mar 06, 2017  9:40 AM   Exam - Established Patient with [facility] dairy products with reduced content of saturated and total fat.    Dietary sodium reduction Reduce dietary sodium intake to <= 100 mmol per day (2.4 g sodium or 6 g sodium chloride)   Aerobic physical activity Regular aerobic physical activity (e.g., brisk